# Patient Record
Sex: FEMALE | Race: OTHER | Employment: UNEMPLOYED | URBAN - METROPOLITAN AREA
[De-identification: names, ages, dates, MRNs, and addresses within clinical notes are randomized per-mention and may not be internally consistent; named-entity substitution may affect disease eponyms.]

---

## 2020-12-07 ENCOUNTER — APPOINTMENT (OUTPATIENT)
Dept: CT IMAGING | Age: 75
DRG: 463 | End: 2020-12-07
Payer: MEDICAID

## 2020-12-07 ENCOUNTER — HOSPITAL ENCOUNTER (INPATIENT)
Age: 75
LOS: 2 days | Discharge: ANOTHER HOSPITAL | DRG: 463 | End: 2020-12-09
Attending: EMERGENCY MEDICINE | Admitting: INTERNAL MEDICINE
Payer: MEDICAID

## 2020-12-07 PROBLEM — N39.0 COMPLICATED UTI (URINARY TRACT INFECTION): Status: ACTIVE | Noted: 2020-12-07

## 2020-12-07 LAB
ALBUMIN SERPL-MCNC: 3.4 G/DL (ref 3.5–5.2)
ALP BLD-CCNC: 153 U/L (ref 35–104)
ALT SERPL-CCNC: 18 U/L (ref 0–32)
ANION GAP SERPL CALCULATED.3IONS-SCNC: 9 MMOL/L (ref 7–16)
ANISOCYTOSIS: ABNORMAL
AST SERPL-CCNC: 29 U/L (ref 0–31)
BACTERIA: ABNORMAL /HPF
BASOPHILS ABSOLUTE: 0.11 E9/L (ref 0–0.2)
BASOPHILS RELATIVE PERCENT: 0.5 % (ref 0–2)
BILIRUB SERPL-MCNC: 0.2 MG/DL (ref 0–1.2)
BILIRUBIN URINE: NEGATIVE
BLOOD, URINE: ABNORMAL
BUN BLDV-MCNC: 11 MG/DL (ref 8–23)
CALCIUM SERPL-MCNC: 9.9 MG/DL (ref 8.6–10.2)
CHLORIDE BLD-SCNC: 98 MMOL/L (ref 98–107)
CLARITY: ABNORMAL
CO2: 28 MMOL/L (ref 22–29)
COLOR: YELLOW
CREAT SERPL-MCNC: 0.6 MG/DL (ref 0.5–1)
EOSINOPHILS ABSOLUTE: 6.81 E9/L (ref 0.05–0.5)
EOSINOPHILS RELATIVE PERCENT: 28.6 % (ref 0–6)
GFR AFRICAN AMERICAN: >60
GFR NON-AFRICAN AMERICAN: >60 ML/MIN/1.73
GLUCOSE BLD-MCNC: 139 MG/DL (ref 74–99)
GLUCOSE URINE: NEGATIVE MG/DL
HCT VFR BLD CALC: 33.1 % (ref 34–48)
HEMOGLOBIN: 10.4 G/DL (ref 11.5–15.5)
IMMATURE GRANULOCYTES #: 0.12 E9/L
IMMATURE GRANULOCYTES %: 0.5 % (ref 0–5)
KETONES, URINE: NEGATIVE MG/DL
LACTIC ACID: 1.9 MMOL/L (ref 0.5–2.2)
LEUKOCYTE ESTERASE, URINE: ABNORMAL
LIPASE: 21 U/L (ref 13–60)
LYMPHOCYTES ABSOLUTE: 3.03 E9/L (ref 1.5–4)
LYMPHOCYTES RELATIVE PERCENT: 12.7 % (ref 20–42)
MCH RBC QN AUTO: 28.7 PG (ref 26–35)
MCHC RBC AUTO-ENTMCNC: 31.4 % (ref 32–34.5)
MCV RBC AUTO: 91.2 FL (ref 80–99.9)
MONOCYTES ABSOLUTE: 1.38 E9/L (ref 0.1–0.95)
MONOCYTES RELATIVE PERCENT: 5.8 % (ref 2–12)
NEUTROPHILS ABSOLUTE: 12.35 E9/L (ref 1.8–7.3)
NEUTROPHILS RELATIVE PERCENT: 51.9 % (ref 43–80)
NITRITE, URINE: POSITIVE
PDW BLD-RTO: 14.1 FL (ref 11.5–15)
PH UA: 6 (ref 5–9)
PLATELET # BLD: 629 E9/L (ref 130–450)
PMV BLD AUTO: 9.1 FL (ref 7–12)
POTASSIUM SERPL-SCNC: 3.6 MMOL/L (ref 3.5–5)
PROTEIN UA: 100 MG/DL
RBC # BLD: 3.63 E12/L (ref 3.5–5.5)
RBC UA: >20 /HPF (ref 0–2)
SODIUM BLD-SCNC: 135 MMOL/L (ref 132–146)
SPECIFIC GRAVITY UA: >=1.03 (ref 1–1.03)
TOTAL PROTEIN: 7.9 G/DL (ref 6.4–8.3)
UROBILINOGEN, URINE: 0.2 E.U./DL
WBC # BLD: 23.8 E9/L (ref 4.5–11.5)
WBC UA: >20 /HPF (ref 0–5)

## 2020-12-07 PROCEDURE — 85025 COMPLETE CBC W/AUTO DIFF WBC: CPT

## 2020-12-07 PROCEDURE — 87088 URINE BACTERIA CULTURE: CPT

## 2020-12-07 PROCEDURE — 99223 1ST HOSP IP/OBS HIGH 75: CPT | Performed by: INTERNAL MEDICINE

## 2020-12-07 PROCEDURE — 83690 ASSAY OF LIPASE: CPT

## 2020-12-07 PROCEDURE — 83605 ASSAY OF LACTIC ACID: CPT

## 2020-12-07 PROCEDURE — 87040 BLOOD CULTURE FOR BACTERIA: CPT

## 2020-12-07 PROCEDURE — 2580000003 HC RX 258: Performed by: EMERGENCY MEDICINE

## 2020-12-07 PROCEDURE — 81001 URINALYSIS AUTO W/SCOPE: CPT

## 2020-12-07 PROCEDURE — 6360000004 HC RX CONTRAST MEDICATION: Performed by: RADIOLOGY

## 2020-12-07 PROCEDURE — 2580000003 HC RX 258: Performed by: RADIOLOGY

## 2020-12-07 PROCEDURE — 99284 EMERGENCY DEPT VISIT MOD MDM: CPT

## 2020-12-07 PROCEDURE — 74177 CT ABD & PELVIS W/CONTRAST: CPT

## 2020-12-07 PROCEDURE — 2060000000 HC ICU INTERMEDIATE R&B

## 2020-12-07 PROCEDURE — 6360000002 HC RX W HCPCS: Performed by: EMERGENCY MEDICINE

## 2020-12-07 PROCEDURE — 80053 COMPREHEN METABOLIC PANEL: CPT

## 2020-12-07 RX ORDER — 0.9 % SODIUM CHLORIDE 0.9 %
1000 INTRAVENOUS SOLUTION INTRAVENOUS ONCE
Status: COMPLETED | OUTPATIENT
Start: 2020-12-07 | End: 2020-12-08

## 2020-12-07 RX ORDER — SODIUM CHLORIDE 0.9 % (FLUSH) 0.9 %
10 SYRINGE (ML) INJECTION ONCE
Status: COMPLETED | OUTPATIENT
Start: 2020-12-07 | End: 2020-12-07

## 2020-12-07 RX ADMIN — Medication 10 ML: at 17:34

## 2020-12-07 RX ADMIN — SODIUM CHLORIDE 1000 ML: 9 INJECTION, SOLUTION INTRAVENOUS at 23:13

## 2020-12-07 RX ADMIN — CEFEPIME 2 G: 2 INJECTION, POWDER, FOR SOLUTION INTRAVENOUS at 18:53

## 2020-12-07 RX ADMIN — IOPAMIDOL 75 ML: 755 INJECTION, SOLUTION INTRAVENOUS at 17:38

## 2020-12-07 ASSESSMENT — PAIN DESCRIPTION - LOCATION: LOCATION: ABDOMEN

## 2020-12-07 NOTE — ED PROVIDER NOTES
FIRST PROVIDER CONTACT ASSESSMENT NOTE      Department of Emergency Medicine   ED  First Provider Note   12/7/20  2:13 PM EST    Chief Complaint: Abdominal Pain (from [facility], dx with cervix / bladder cancer, family brought to [place] for care. pt has what appears to be  bilateral nephrostomy tubes in place. pt having abd pain and  ascites)      History of Present Illness:    Shelly Stubbs is a 66 y.o. female who presents to the ED by private car for diffuse abdominal pain. The patient just got here from [facility].  She has cervical cancer with what sounds like it is metastatic disease. The patient has bilateral nephrostomy tubes. She was brought here by family members concerned about increasing and ongoing abdominal pain. Focused Screening Exam:  Constitutional:  Alert, appears stated age and is in no distress. *ALLERGIES*     Patient has no known allergies.      ED Triage Vitals   BP Temp Temp src Pulse Resp SpO2 Height Weight   12/07/20 1411 12/07/20 1413 -- 12/07/20 1411 12/07/20 1411 12/07/20 1411 -- --   (!) 119/54 97 °F (36.1 °C)  101 16 99 %          Initial Plan of Care:  Initiate Treatment-Testing, Proceed toTreatment Area When Bed Available for ED Attending/MLP to Continue Care    -----------------END OF FIRST PROVIDER CONTACT ASSESSMENT NOTE--------------  Electronically signed by Gin Jerez PA-C   DD: 12/7/20       Gin Jerez PA-C  12/07/20 1414

## 2020-12-07 NOTE — ED PROVIDER NOTES
HPI:  12/7/20, Time: 4:01 PM TONY Pate is a 66 y.o. female presenting to the ED for abdominal pain. Patient is Mongolian-speaking, and an  was used to obtain a history. Patient is a poor historian, so it was difficult to obtain a complete history. Patient's family member states that approximately 3 weeks ago the patient was seen in [facility] for abdominal pain. She states that the patient has been living in [facility] for approximately 1 year, but she was visiting [facility] on vacation. She states at that time the patient was diagnosed with a \"abdominal tumor. \"  She states that the patient was admitted to the hospital and underwent a biopsy of the tumor. She is unsure of the results. She also states that the patient had bilateral tubes placed in her kidneys. The patient returned to [facility] on December 3. She has no PCP, and she has not followed up with any doctor [facility]. She states that the patient has been having intermittent abdominal pain. The patient has had no documented fevers, chest pain, shortness of breath, cough, nausea, vomiting, or diarrhea. Her nephrostomy tubes have been functioning well with clear urine. The patient has also been voiding spontaneously. Patient's family did present with a discharge summary from [facility].  I reviewed the paperwork. Patient was admitted on November 22, 2020 for \"suspected cervical malignancy with suspected bladder involvement. \"  She underwent a cervical biopsy on 11/25/20 and bilateral nephrostomy tube placement on 11/18/2020. Patient was discharged home without further treatment because paperwork states she was insistent that she wanted to go to [address] [place] [address] for treatment.     Review of Systems:   Unable to obtain complete ROS because patient is a poor historian.          --------------------------------------------- PAST HISTORY ---------------------------------------------  Past Medical History:  has no past medical history on file. Past Surgical History:  has no past surgical history on file. Social History:      Family History: family history is not on file. The patients home medications have been reviewed. Allergies: Patient has no known allergies.     -------------------------------------------------- RESULTS -------------------------------------------------  All laboratory and radiology results have been personally reviewed by myself   LABS:  Results for orders placed or performed during the hospital encounter of 12/07/20   CBC Auto Differential   Result Value Ref Range    WBC 23.8 (H) 4.5 - 11.5 E9/L    RBC 3.63 3.50 - 5.50 E12/L    Hemoglobin 10.4 (L) 11.5 - 15.5 g/dL    Hematocrit 33.1 (L) 34.0 - 48.0 %    MCV 91.2 80.0 - 99.9 fL    MCH 28.7 26.0 - 35.0 pg    MCHC 31.4 (L) 32.0 - 34.5 %    RDW 14.1 11.5 - 15.0 fL    Platelets 316 (H) 082 - 450 E9/L    MPV 9.1 7.0 - 12.0 fL    Neutrophils % 51.9 43.0 - 80.0 %    Immature Granulocytes % 0.5 0.0 - 5.0 %    Lymphocytes % 12.7 (L) 20.0 - 42.0 %    Monocytes % 5.8 2.0 - 12.0 %    Eosinophils % 28.6 (H) 0.0 - 6.0 %    Basophils % 0.5 0.0 - 2.0 %    Neutrophils Absolute 12.35 (H) 1.80 - 7.30 E9/L    Immature Granulocytes # 0.12 E9/L    Lymphocytes Absolute 3.03 1.50 - 4.00 E9/L    Monocytes Absolute 1.38 (H) 0.10 - 0.95 E9/L    Eosinophils Absolute 6.81 (H) 0.05 - 0.50 E9/L    Basophils Absolute 0.11 0.00 - 0.20 E9/L    Anisocytosis 1+    Comprehensive Metabolic Panel   Result Value Ref Range    Sodium 135 132 - 146 mmol/L    Potassium 3.6 3.5 - 5.0 mmol/L    Chloride 98 98 - 107 mmol/L    CO2 28 22 - 29 mmol/L    Anion Gap 9 7 - 16 mmol/L    Glucose 139 (H) 74 - 99 mg/dL    BUN 11 8 - 23 mg/dL    CREATININE 0.6 0.5 - 1.0 mg/dL    GFR Non-African American >60 >=60 mL/min/1.73    GFR African American >60     Calcium 9.9 8.6 - 10.2 mg/dL    Total Protein 7.9 6.4 - 8.3 g/dL    Alb 3.4 (L) 3.5 - 5.2 g/dL    Total Bilirubin 0.2 0.0 - 1.2 mg/dL    Alkaline Phosphatase 153 (H) 35 - 104 U/L    ALT 18 0 - 32 U/L    AST 29 0 - 31 U/L   Lactic Acid, Plasma   Result Value Ref Range    Lactic Acid 1.9 0.5 - 2.2 mmol/L   Lipase   Result Value Ref Range    Lipase 21 13 - 60 U/L   Urinalysis   Result Value Ref Range    Color, UA Yellow Straw/Yellow    Clarity, UA SL CLOUDY Clear    Glucose, Ur Negative Negative mg/dL    Bilirubin Urine Negative Negative    Ketones, Urine Negative Negative mg/dL    Specific Gravity, UA >=1.030 1.005 - 1.030    Blood, Urine MODERATE (A) Negative    pH, UA 6.0 5.0 - 9.0    Protein,  (A) Negative mg/dL    Urobilinogen, Urine 0.2 <2.0 E.U./dL    Nitrite, Urine POSITIVE (A) Negative    Leukocyte Esterase, Urine MODERATE (A) Negative   Microscopic Urinalysis   Result Value Ref Range    WBC, UA >20 (A) 0 - 5 /HPF    RBC, UA >20 0 - 2 /HPF    Bacteria, UA MANY (A) None Seen /HPF       RADIOLOGY:  Interpreted by Radiologist.  CT ABDOMEN PELVIS W IV CONTRAST Additional Contrast? None   Final Result   1. Large, infiltrative cervical mass, demonstrating invasion of the posterior   bladder, distal ureters, uterus and proximal vagina (stage IV). 2. No lymphadenopathy or distant metastases are seen in the abdomen and   pelvis. 3. Percutaneous nephrostomy tubes are seen in the kidneys bilaterally. No   hydronephrosis.          ------------------------- NURSING NOTES AND VITALS REVIEWED ---------------------------   The nursing notes within the ED encounter and vital signs as below have been reviewed.    BP (!) 104/49   Pulse 105   Temp 98.5 °F (36.9 °C) (Oral)   Resp 16   Ht 4' 1\" (1.245 m)   Wt 135 lb (61.2 kg)   SpO2 95%   BMI 39.53 kg/m²   Oxygen Saturation Interpretation: Normal      ---------------------------------------------------PHYSICAL EXAM--------------------------------------      Constitutional/General: Alert, appears chronically ill, non toxic in NAD  Head: Normocephalic and atraumatic  Eyes: PERRL, EOMI  Mouth: Oropharynx clear, handling secretions, no trismus  Neck: Supple, full ROM,   Pulmonary: Lungs clear to auscultation bilaterally, no wheezes, rales, or rhonchi. Not in respiratory distress  Cardiovascular:  Regular rate and rhythm, no murmurs, gallops, or rubs. 2+ distal pulses  Abdomen: Soft, non tender, non distended, bilateral nephrostomy tubes in place with clear yellow urine in bags. Extremities: Moves all extremities x 4. Warm and well perfused  Skin: warm and dry without rash  Neurologic: GCS 15, no focal motor or sensory deficits   Psych: Normal Affect      ------------------------------ ED COURSE/MEDICAL DECISION MAKING----------------------  Medications   cefepime (MAXIPIME) 2 g IVPB extended (mini-bag) (2 g Intravenous New Bag 12/7/20 9814)   0.9 % sodium chloride bolus (has no administration in time range)   iopamidol (ISOVUE-370) 76 % injection 75 mL (75 mLs Intravenous Given 12/7/20 5995)   sodium chloride flush 0.9 % injection 10 mL (10 mLs Intravenous Given 12/7/20 8249)       Medical Decision Making/ED COURSE:   Patient is a 57-year-old female with recent diagnosis of cervical tumor and bilateral nephrostomy tube placement in [facility] presenting with abdominal pain. In the ED, patient was hemodynamically stable and afebrile. On exam, appeared chronically ill but nontoxic. Labs and CT AP obtained. I reviewed and interpreted labs. Labs significant for a leukocytosis of 23.8. Urinalysis obtained from patient voiding showed evidence of infection. Urine and blood culture sent. Patient administered IVF and cefepime for complicated UTI. CT abdomen pelvis showed large tumor that appeared to be arising from the cervix with bladder involvement. Plan to admit for further work-up and treatment. Patient remained hemodynamically stable throughout ED course. ED Course as of Dec 07 2122   Mon Dec 07, 2020   1816 Hospitalist was consulted. Spoke with Dr. Marissa Kwon who accepted the patient for admission.     [JA]   1848 Discussed extensively with patient's daughter now at bedside findings and plan for admission. An  was used. [JA]      ED Course User Index  [JA] Mago Nolasco MD           Counseling: The emergency provider has spoken with the patient and discussed todays results, in addition to providing specific details for the plan of care and counseling regarding the diagnosis and prognosis. Questions are answered at this time and they are agreeable with the plan.      --------------------------------- IMPRESSION AND DISPOSITION ---------------------------------    IMPRESSION  1. Cervical mass    2. Complicated UTI (urinary tract infection)    3. Leukocytosis, unspecified type    4. Thrombocytosis ([address]        DISPOSITION  Disposition: Admit to telemetry  Patient condition is stable      NOTE: This report was transcribed using voice recognition software.  Every effort was made to ensure accuracy; however, inadvertent computerized transcription errors may be present    I, Mago Nolasco MD, am the primary provider of this record       Mago Nolasco MD  12/07/20 7434

## 2020-12-08 LAB
ANION GAP SERPL CALCULATED.3IONS-SCNC: 7 MMOL/L (ref 7–16)
BASOPHILS ABSOLUTE: 0.13 E9/L (ref 0–0.2)
BASOPHILS RELATIVE PERCENT: 0.6 % (ref 0–2)
BUN BLDV-MCNC: 7 MG/DL (ref 8–23)
CALCIUM SERPL-MCNC: 9.2 MG/DL (ref 8.6–10.2)
CHLORIDE BLD-SCNC: 106 MMOL/L (ref 98–107)
CO2: 23 MMOL/L (ref 22–29)
CREAT SERPL-MCNC: 0.5 MG/DL (ref 0.5–1)
EOSINOPHILS ABSOLUTE: 6.95 E9/L (ref 0.05–0.5)
EOSINOPHILS RELATIVE PERCENT: 30.1 % (ref 0–6)
GFR AFRICAN AMERICAN: >60
GFR NON-AFRICAN AMERICAN: >60 ML/MIN/1.73
GLUCOSE BLD-MCNC: 94 MG/DL (ref 74–99)
HCT VFR BLD CALC: 30.7 % (ref 34–48)
HEMOGLOBIN: 9.5 G/DL (ref 11.5–15.5)
IMMATURE GRANULOCYTES #: 0.11 E9/L
IMMATURE GRANULOCYTES %: 0.5 % (ref 0–5)
LYMPHOCYTES ABSOLUTE: 3.31 E9/L (ref 1.5–4)
LYMPHOCYTES RELATIVE PERCENT: 14.3 % (ref 20–42)
MCH RBC QN AUTO: 28.1 PG (ref 26–35)
MCHC RBC AUTO-ENTMCNC: 30.9 % (ref 32–34.5)
MCV RBC AUTO: 90.8 FL (ref 80–99.9)
MONOCYTES ABSOLUTE: 1.4 E9/L (ref 0.1–0.95)
MONOCYTES RELATIVE PERCENT: 6.1 % (ref 2–12)
NEUTROPHILS ABSOLUTE: 11.21 E9/L (ref 1.8–7.3)
NEUTROPHILS RELATIVE PERCENT: 48.4 % (ref 43–80)
PDW BLD-RTO: 14.2 FL (ref 11.5–15)
PLATELET # BLD: 582 E9/L (ref 130–450)
PMV BLD AUTO: 9.4 FL (ref 7–12)
POTASSIUM REFLEX MAGNESIUM: 3.8 MMOL/L (ref 3.5–5)
RBC # BLD: 3.38 E12/L (ref 3.5–5.5)
RBC # BLD: NORMAL 10*6/UL
SODIUM BLD-SCNC: 136 MMOL/L (ref 132–146)
WBC # BLD: 23.1 E9/L (ref 4.5–11.5)

## 2020-12-08 PROCEDURE — 85025 COMPLETE CBC W/AUTO DIFF WBC: CPT

## 2020-12-08 PROCEDURE — 1200000000 HC SEMI PRIVATE

## 2020-12-08 PROCEDURE — 80048 BASIC METABOLIC PNL TOTAL CA: CPT

## 2020-12-08 PROCEDURE — 6370000000 HC RX 637 (ALT 250 FOR IP): Performed by: INTERNAL MEDICINE

## 2020-12-08 PROCEDURE — 99238 HOSP IP/OBS DSCHRG MGMT 30/<: CPT | Performed by: INTERNAL MEDICINE

## 2020-12-08 PROCEDURE — 6370000000 HC RX 637 (ALT 250 FOR IP): Performed by: NURSE PRACTITIONER

## 2020-12-08 PROCEDURE — 6360000002 HC RX W HCPCS: Performed by: INTERNAL MEDICINE

## 2020-12-08 PROCEDURE — 36415 COLL VENOUS BLD VENIPUNCTURE: CPT

## 2020-12-08 PROCEDURE — 2580000003 HC RX 258: Performed by: INTERNAL MEDICINE

## 2020-12-08 RX ORDER — POLYETHYLENE GLYCOL 3350 17 G/17G
17 POWDER, FOR SOLUTION ORAL DAILY PRN
Status: DISCONTINUED | OUTPATIENT
Start: 2020-12-08 | End: 2020-12-09 | Stop reason: HOSPADM

## 2020-12-08 RX ORDER — ACETAMINOPHEN 650 MG/1
650 SUPPOSITORY RECTAL EVERY 6 HOURS PRN
Status: DISCONTINUED | OUTPATIENT
Start: 2020-12-08 | End: 2020-12-09 | Stop reason: HOSPADM

## 2020-12-08 RX ORDER — SODIUM CHLORIDE 0.9 % (FLUSH) 0.9 %
10 SYRINGE (ML) INJECTION PRN
Status: DISCONTINUED | OUTPATIENT
Start: 2020-12-08 | End: 2020-12-09 | Stop reason: HOSPADM

## 2020-12-08 RX ORDER — HEPARIN SODIUM 10000 [USP'U]/ML
5000 INJECTION, SOLUTION INTRAVENOUS; SUBCUTANEOUS EVERY 8 HOURS SCHEDULED
Status: DISCONTINUED | OUTPATIENT
Start: 2020-12-08 | End: 2020-12-09 | Stop reason: HOSPADM

## 2020-12-08 RX ORDER — LIDOCAINE AND PRILOCAINE 25; 25 MG/G; MG/G
CREAM TOPICAL PRN
Status: DISCONTINUED | OUTPATIENT
Start: 2020-12-08 | End: 2020-12-09 | Stop reason: HOSPADM

## 2020-12-08 RX ORDER — ACETAMINOPHEN 325 MG/1
650 TABLET ORAL EVERY 6 HOURS PRN
COMMUNITY

## 2020-12-08 RX ORDER — SODIUM CHLORIDE 9 MG/ML
INJECTION, SOLUTION INTRAVENOUS CONTINUOUS
Status: DISCONTINUED | OUTPATIENT
Start: 2020-12-08 | End: 2020-12-09 | Stop reason: HOSPADM

## 2020-12-08 RX ORDER — SODIUM CHLORIDE 0.9 % (FLUSH) 0.9 %
10 SYRINGE (ML) INJECTION EVERY 12 HOURS SCHEDULED
Status: DISCONTINUED | OUTPATIENT
Start: 2020-12-08 | End: 2020-12-09 | Stop reason: HOSPADM

## 2020-12-08 RX ORDER — ACETAMINOPHEN 325 MG/1
650 TABLET ORAL EVERY 6 HOURS PRN
Status: DISCONTINUED | OUTPATIENT
Start: 2020-12-08 | End: 2020-12-09 | Stop reason: HOSPADM

## 2020-12-08 RX ADMIN — SODIUM CHLORIDE: 9 INJECTION, SOLUTION INTRAVENOUS at 01:30

## 2020-12-08 RX ADMIN — SODIUM CHLORIDE, PRESERVATIVE FREE 10 ML: 5 INJECTION INTRAVENOUS at 23:21

## 2020-12-08 RX ADMIN — ACETAMINOPHEN 650 MG: 325 TABLET ORAL at 05:19

## 2020-12-08 RX ADMIN — LIDOCAINE AND PRILOCAINE: 25; 25 CREAM TOPICAL at 22:30

## 2020-12-08 RX ADMIN — POLYETHYLENE GLYCOL 3350 17 G: 17 POWDER, FOR SOLUTION ORAL at 23:30

## 2020-12-08 RX ADMIN — CEFEPIME 2 G: 2 INJECTION, POWDER, FOR SOLUTION INTRAVENOUS at 06:59

## 2020-12-08 RX ADMIN — MAGNESIUM HYDROXIDE 30 ML: 400 SUSPENSION ORAL at 15:13

## 2020-12-08 RX ADMIN — CEFEPIME 2 G: 2 INJECTION, POWDER, FOR SOLUTION INTRAVENOUS at 19:03

## 2020-12-08 RX ADMIN — ACETAMINOPHEN 650 MG: 325 TABLET ORAL at 15:11

## 2020-12-08 RX ADMIN — SODIUM CHLORIDE, PRESERVATIVE FREE 10 ML: 5 INJECTION INTRAVENOUS at 19:00

## 2020-12-08 RX ADMIN — SODIUM CHLORIDE, PRESERVATIVE FREE 10 ML: 5 INJECTION INTRAVENOUS at 16:27

## 2020-12-08 ASSESSMENT — PAIN SCALES - GENERAL
PAINLEVEL_OUTOF10: 0
PAINLEVEL_OUTOF10: 6
PAINLEVEL_OUTOF10: 0

## 2020-12-08 NOTE — PROGRESS NOTES
Access center called and stated they need a prior auth. NPI- Dr. Kenia Lucio [id number]. Pt's insurance number on back of card is [phone]. Attemptd to get prior auth for pt but once on the phone with [employer] they wanted more information that I could not provide I had to end call because of lack of information.    will have to set up in am.

## 2020-12-08 NOTE — H&P
[facility] History and Physical      CHIEF COMPLAINT: hematuria    History of Present Illness:  67 yo female who speaks Uzbek, poor historian even with . A week ago noticed hematuria for 2 days. Associated with burning micturition that has mostly resolved but still with some persistence. Had a fever for one day that resolved with tylenol. She is unable to provide further history therefore obtained from daughter/granddaughter over the phone. Recently with vaginal bleeding, constipation and incontinence. Was on vacation in [place] 3 weeks ago, went to a hospital there where she underwent bilateral nephrostomy tube placement, had a biopsy but the family members arent able to answer where it was from. They were told she has a cancer, also she has a urine and blood infection. She left the hospital there and came to the ED here to continue the workup. Informant(s) for H&P:pt/daughter/granddaughter. REVIEW OF SYSTEMS:  A comprehensive 14 point review of systems was negative except for: what is in the HPI    PMH:  none    Surgical History:  No past surgical history on file. Appendectomy    Medications Prior to Admission:    Prior to Admission medications    Not on File       Allergies:    Patient has no known allergies.     Social History:      Prior tobacco abuse but quit 3 wks ago every half hr a cigarette    Family History:   Mom colon ca      PHYSICAL EXAM:  Vitals:  BP (!) 104/49   Pulse 105   Temp 98.5 °F (36.9 °C) (Oral)   Resp 16   Ht 4' 1\" (1.245 m)   Wt 135 lb (61.2 kg)   SpO2 95%   BMI 39.53 kg/m²   General Appearance: alert and oriented to person, place and time and in no acute distress  Skin: warm and dry, turgor not diminished  Head: normocephalic and atraumatic  Eyes: pupils equal, round, and reactive to light, extraocular eye movements intact, conjunctivae normal  Neck: neck supple and non tender without mass   Pulmonary/Chest: clear to auscultation bilaterally- no wheezes, rales or rhonchi, normal air movement, no respiratory distress  Cardiovascular: normal rate, normal S1 and S2 and no M/R/R  Abdomen: soft, non-tender, non-distended, normal bowel sounds, no masses or organomegaly  Extremities: no cyanosis, no clubbing and no edema  Neurologic: no cranial nerve deficit and speech normal      LABS:  Recent Labs     12/07/20  1457      K 3.6   CL 98   CO2 28   BUN 11   CREATININE 0.6   GLUCOSE 139*   CALCIUM 9.9       Recent Labs     12/07/20  1457   WBC 23.8*   RBC 3.63   HGB 10.4*   HCT 33.1*   MCV 91.2   MCH 28.7   MCHC 31.4*   RDW 14.1   *   MPV 9.1       No results for input(s): POCGLU in the last 72 hours. Radiology:   CT ABDOMEN PELVIS W IV CONTRAST Additional Contrast? None   Final Result   1. Large, infiltrative cervical mass, demonstrating invasion of the posterior   bladder, distal ureters, uterus and proximal vagina (stage IV). 2. No lymphadenopathy or distant metastases are seen in the abdomen and   pelvis. 3. Percutaneous nephrostomy tubes are seen in the kidneys bilaterally. No   hydronephrosis. EKG: pending    ASSESSMENT:    Urinary tract infection and told she has bacteremia  Suspected cervical cancer s/p biopsy in [place]   Urinary obstruction s/p bilateral nephrostomy tubes  Prior tobacco abuse    PLAN:  UTI/?bacteremia  -repeat blood culture, urine culture  -continue cefepime, ivf    Suspected cervical cancer:  -obtain records from [facility]  -consult gyn onc. Urinary obstruction:   -nephrostomy tubes draining clear urine now    DVT prophylaxis: heparin and monitor for recurrence of hematuria. NOTE: This report was transcribed using voice recognition software. Every effort was made to ensure accuracy; however, inadvertent computerized transcription errors may be present.   Electronically signed by Carmen Lopez MD on 12/7/2020 at 9:09 PM

## 2020-12-08 NOTE — PROGRESS NOTES
As per gynecologist over here she should be transferred to [facility] or [place] since we do not have a [place] [address]  service. Transfer arranged at [facility]. Floor notified. She will be transferred in stable status.

## 2020-12-08 NOTE — CONSULTS
Department of Gynecology   Consultation      Reason for Consult: GYN oncology for stage IV cervical cancer    Requesting Physician:  Lakia Gauthier MD    CHIEF COMPLAINT:   Hematuria    PROBLEM LIST:     Patient Active Problem List   Diagnosis    Complicated UTI (urinary tract infection)       Reason for Admission: UTI complicated by bacteremia    History obtained from patient through a hospital , electronic medical record    HISTORY OF PRESENT ILLNESS:                     The patient is a 66 y.o. female  who presents with known history of stage IV cervical cancer with urinary obstruction status post bilateral nephrostomy tubes. Patient was admitted on November 22, 2020 in [facility] for \"suspected cervical malignancy with suspected bladder involvement. \"  She underwent a cervical biopsy on 11/25/20 and bilateral nephrostomy tube placement on 11/18/2020. Patient was discharged home without further treatment because paperwork states she was insistent that she wanted to go to [place] for treatment. Past Gynecological History:  Last menstrual period: Is menopausal patient bleeds intermittently    Past OB History  OB History    No obstetric history on file. OB History   No obstetric history on file. Past Medical History:    No past medical history on file. Past Surgical History:    No past surgical history on file. Allergies:    Patient has no known allergies.     Social History:    Social History     Socioeconomic History    Marital status: Single     Spouse name: Not on file    Number of children: Not on file    Years of education: Not on file    Highest education level: Not on file   Occupational History    Not on file   Social Needs    Financial resource strain: Not on file    Food insecurity     Worry: Not on file     Inability: Not on file    Transportation needs     Medical: Not on file     Non-medical: Not on file   Tobacco Use    Smoking status: Not on file   Substance and Sexual Activity    Alcohol use: Not on file    Drug use: Not on file    Sexual activity: Not on file   Lifestyle    Physical activity     Days per week: Not on file     Minutes per session: Not on file    Stress: Not on file   Relationships    Social connections     Talks on phone: Not on file     Gets together: Not on file     Attends Jewish service: Not on file     Active member of club or organization: Not on file     Attends meetings of clubs or organizations: Not on file     Relationship status: Not on file    Intimate partner violence     Fear of current or ex partner: Not on file     Emotionally abused: Not on file     Physically abused: Not on file     Forced sexual activity: Not on file   Other Topics Concern    Not on file   Social History Narrative    Not on file       Family History:   No family history on file.     Medications Prior to Admission:  Medications Prior to Admission: acetaminophen (TYLENOL) 325 MG tablet, Take 650 mg by mouth every 6 hours as needed for Pain    REVIEW OF SYSTEMS:    Constitutional:  negative  Gastrointestinal:  negative  Genitourinary: Has nephrostomy tubes  Integument/breast:  negative  Hematologic/lymphatic:  negative  Endocrine:  negative  Behavior/Psych:  negative    PHYSICAL EXAM:    Vitals:  BP (!) 106/53   Pulse 87   Temp 99.8 °F (37.7 °C) (Oral)   Resp 16   Ht 4' 1\" (1.245 m)   Wt 136 lb 2 oz (61.7 kg)   SpO2 97%   BMI 39.86 kg/m²   CONSTITUTIONAL:  awake, alert, cooperative, no apparent distress, and appears stated age  EYES:  Lids and lashes normal, pupils equal, round and reactive to light, extra ocular muscles intact, sclera clear, conjunctiva normal  ENT:  Normocephalic, without obvious abnormality, atraumatic, sinuses nontender on palpation, external ears without lesions, oral pharynx with moist mucus membranes  NECK:  Supple, symmetrical, trachea midline, no adenopathy, thyroid symmetric, not enlarged and no tenderness, skin normal  HEMATOLOGIC/LYMPHATICS:  no cervical lymphadenopathy and no supraclavicular lymphadenopathy  BACK:  Symmetric, no curvature, spinous processes are non-tender on palpation, paraspinous muscles are non-tender on palpation, no costal vertebral tenderness  LUNGS:  No increased work of breathing, good air exchange, clear to auscultation bilaterally, no crackles or wheezing  CARDIOVASCULAR:  Normal apical impulse, regular rate and rhythm  ABDOMEN:  Normal bowel sounds, soft, non-distended, non-tender, no masses palpated, no hepatosplenomegally   CHEST/BREASTS:  deferred  GENITAL/URINARY:  deferred  MUSCULOSKELETAL:  There is no redness, warmth, or swelling of the joints. Full range of motion noted. Motor strength is 5 out of 5 all extremities bilaterally. Tone is normal.  NEUROLOGIC:  Awake, alert, oriented to name, place and time. Cranial nerves II-XII are grossly intact. Motor is 5 out of 5 bilaterally. Cerebellar finger to nose, heel to shin intact. Sensory is intact. Babinski down going, Romberg negative, and gait is normal.  SKIN:  no bruising or bleeding, normal skin color, texture, turgor, no redness, warmth, or swelling and no rashes    DATA:  Radiology:   CT ABDOMEN PELVIS W IV CONTRAST Additional Contrast? None   Final Result   1. Large, infiltrative cervical mass, demonstrating invasion of the posterior   bladder, distal ureters, uterus and proximal vagina (stage IV). 2. No lymphadenopathy or distant metastases are seen in the abdomen and   pelvis. 3. Percutaneous nephrostomy tubes are seen in the kidneys bilaterally.   No   hydronephrosis.           LABS:  Recent Results (from the past 72 hour(s))   CBC Auto Differential    Collection Time: 12/07/20  2:57 PM   Result Value Ref Range    WBC 23.8 (H) 4.5 - 11.5 E9/L    RBC 3.63 3.50 - 5.50 E12/L    Hemoglobin 10.4 (L) 11.5 - 15.5 g/dL    Hematocrit 33.1 (L) 34.0 - 48.0 %    MCV 91.2 80.0 - 99.9 fL    MCH 28.7 26.0 - 35.0 pg    MCHC 31.4 (L) 32.0 - 34.5 %    RDW 14.1 11.5 - 15.0 fL    Platelets 182 (H) 262 - 450 E9/L    MPV 9.1 7.0 - 12.0 fL    Neutrophils % 51.9 43.0 - 80.0 %    Immature Granulocytes % 0.5 0.0 - 5.0 %    Lymphocytes % 12.7 (L) 20.0 - 42.0 %    Monocytes % 5.8 2.0 - 12.0 %    Eosinophils % 28.6 (H) 0.0 - 6.0 %    Basophils % 0.5 0.0 - 2.0 %    Neutrophils Absolute 12.35 (H) 1.80 - 7.30 E9/L    Immature Granulocytes # 0.12 E9/L    Lymphocytes Absolute 3.03 1.50 - 4.00 E9/L    Monocytes Absolute 1.38 (H) 0.10 - 0.95 E9/L    Eosinophils Absolute 6.81 (H) 0.05 - 0.50 E9/L    Basophils Absolute 0.11 0.00 - 0.20 E9/L    Anisocytosis 1+    Comprehensive Metabolic Panel    Collection Time: 12/07/20  2:57 PM   Result Value Ref Range    Sodium 135 132 - 146 mmol/L    Potassium 3.6 3.5 - 5.0 mmol/L    Chloride 98 98 - 107 mmol/L    CO2 28 22 - 29 mmol/L    Anion Gap 9 7 - 16 mmol/L    Glucose 139 (H) 74 - 99 mg/dL    BUN 11 8 - 23 mg/dL    CREATININE 0.6 0.5 - 1.0 mg/dL    GFR Non-African American >60 >=60 mL/min/1.73    GFR African American >60     Calcium 9.9 8.6 - 10.2 mg/dL    Total Protein 7.9 6.4 - 8.3 g/dL    Alb 3.4 (L) 3.5 - 5.2 g/dL    Total Bilirubin 0.2 0.0 - 1.2 mg/dL    Alkaline Phosphatase 153 (H) 35 - 104 U/L    ALT 18 0 - 32 U/L    AST 29 0 - 31 U/L   Lactic Acid, Plasma    Collection Time: 12/07/20  2:57 PM   Result Value Ref Range    Lactic Acid 1.9 0.5 - 2.2 mmol/L   Lipase    Collection Time: 12/07/20  2:57 PM   Result Value Ref Range    Lipase 21 13 - 60 U/L   Urinalysis    Collection Time: 12/07/20  4:01 PM   Result Value Ref Range    Color, UA Yellow Straw/Yellow    Clarity, UA SL CLOUDY Clear    Glucose, Ur Negative Negative mg/dL    Bilirubin Urine Negative Negative    Ketones, Urine Negative Negative mg/dL    Specific Gravity, UA >=1.030 1.005 - 1.030    Blood, Urine MODERATE (A) Negative    pH, UA 6.0 5.0 - 9.0    Protein,  (A) Negative mg/dL    Urobilinogen, Urine 0.2 <2.0 E.U./dL    Nitrite, Urine POSITIVE (A) Negative    Leukocyte Esterase, Urine MODERATE (A) Negative   Culture, Urine    Collection Time: 12/07/20  4:01 PM    Specimen: Urine, clean catch   Result Value Ref Range    Urine Culture, Routine       Growth present, evaluating for:  Gram positive organism  Gram negative rods     Microscopic Urinalysis    Collection Time: 12/07/20  4:01 PM   Result Value Ref Range    WBC, UA >20 (A) 0 - 5 /HPF    RBC, UA >20 0 - 2 /HPF    Bacteria, UA MANY (A) None Seen /HPF   Basic Metabolic Panel w/ Reflex to MG    Collection Time: 12/08/20  7:05 AM   Result Value Ref Range    Sodium 136 132 - 146 mmol/L    Potassium reflex Magnesium 3.8 3.5 - 5.0 mmol/L    Chloride 106 98 - 107 mmol/L    CO2 23 22 - 29 mmol/L    Anion Gap 7 7 - 16 mmol/L    Glucose 94 74 - 99 mg/dL    BUN 7 (L) 8 - 23 mg/dL    CREATININE 0.5 0.5 - 1.0 mg/dL    GFR Non-African American >60 >=60 mL/min/1.73    GFR African American >60     Calcium 9.2 8.6 - 10.2 mg/dL   CBC auto differential    Collection Time: 12/08/20  7:05 AM   Result Value Ref Range    WBC 23.1 (H) 4.5 - 11.5 E9/L    RBC 3.38 (L) 3.50 - 5.50 E12/L    Hemoglobin 9.5 (L) 11.5 - 15.5 g/dL    Hematocrit 30.7 (L) 34.0 - 48.0 %    MCV 90.8 80.0 - 99.9 fL    MCH 28.1 26.0 - 35.0 pg    MCHC 30.9 (L) 32.0 - 34.5 %    RDW 14.2 11.5 - 15.0 fL    Platelets 117 (H) 942 - 450 E9/L    MPV 9.4 7.0 - 12.0 fL    Neutrophils % 48.4 43.0 - 80.0 %    Immature Granulocytes % 0.5 0.0 - 5.0 %    Lymphocytes % 14.3 (L) 20.0 - 42.0 %    Monocytes % 6.1 2.0 - 12.0 %    Eosinophils % 30.1 (H) 0.0 - 6.0 %    Basophils % 0.6 0.0 - 2.0 %    Neutrophils Absolute 11.21 (H) 1.80 - 7.30 E9/L    Immature Granulocytes # 0.11 E9/L    Lymphocytes Absolute 3.31 1.50 - 4.00 E9/L    Monocytes Absolute 1.40 (H) 0.10 - 0.95 E9/L    Eosinophils Absolute 6.95 (H) 0.05 - 0.50 E9/L    Basophils Absolute 0.13 0.00 - 0.20 E9/L    RBC Morphology Normal        IMPRESSION/RECOMMENDATIONS:    Stage IV cervical cancer with bladder involvement and nephrostomy tube placement    The American Congress of Obstetrics and Gynecology (ACOG) strongly recommends that patients that have known gynecologic cancer or high suspicion of a gynecologic malignancy undergo operative management by GYN oncology for proper surgical staging as this has been shown to significantly improve patient outcomes and long-term survival rates. In addition to this we do not have an in-house GYN ONC Service and OB/GYN Generalists on Staff do not have operative privileges to medically or surgically manage patients with a high index of suspicion for malignancy let alone an established diagnosis. Depending on her and her family's preferences, patient may be discharged home and be referred to GYN oncology as an outpatient or arrangements may be made for transfer to a tertiary institution with GYN oncology services (Dr Medardo Islas at [facility]; Dr Marsha Jasmine at [facility]; or providers in [place] or [facility] in [place]). For continuity purposes she may follow-up in the OB/GYN ambulatory clinic under the care of Dr. Soha Lopez after discharged from the care of GYN/ONC.     Valentine Jones MD, FACOG  12/8/2020 12:17 PM

## 2020-12-08 NOTE — DISCHARGE SUMMARY
[facility] Physician Discharge Summary       Transfer to [facility]          Condition on discharge: Stable     Patient ID:  Lan Limon  [id number]  66 y.o.  1945    Admit date: 12/7/2020    Discharge date and time:  12/8/2020  3:27 PM    Admission Diagnoses: Active Problems:    Complicated UTI (urinary tract infection)  Resolved Problems:    * No resolved hospital problems. *      Discharge Diagnoses: Active Problems:    Complicated UTI (urinary tract infection)  Resolved Problems:    * No resolved hospital problems. *      Consults:  IP CONSULT TO OB GYN    Procedures: none     Hospital Course:   Patient Lan Limon is a 66 y.o. presented with Complicated UTI (urinary tract infection) [Q70.4]  Complicated UTI (urinary tract infection) [N39.0]   Patient presented to the ER with complaints of abdominal pain. She was treated for;      1.  Cervical mass/ suspected cervical cancer: pt presented to the ER with concern of abdominal pain. Pt has been residing in [address] [place] [address] for 1 year. Pt recently was on vacation in [facility] where she had bilateral nephrostomy tubes placed and was told \" abdominal tumor. \" She had a biopsy done at that time. Concern that she had suspected cervical malignancy with bladder involvement. She then returned to [address] [place] [address] on 12/3. She has no PCP or follow up. He was discharged with no follow up or treatment plan as she was insistent that she wanted to return to [place] for treatment. OB/ GYN consulted- appreciate input. Pt requires GYN ONC service which we don't offer her. Recommendation was for pt to be transferred to tertiary center. Transfer line was called by attending and patient has been admitted at [facility]- awaiting bed.     2. Complicated UTI with bilateral nephrostomy tubes: UA reviewed. Continue cefepime. Follow culture.     3. S/p nephrostomy tubes due to obstruction.  CT abdomen reviewed.      4. Constipation: bowel regimen        Patient to be transferred to [facility] to be evaluated by GYN/ ONC services. Pt was then transferred to tertiary center with the following medications, instructions, and follow up.         Discharge Exam:  General Appearance: alert and oriented to person, place and time and in no acute distress  Skin: warm and dry  Head: normocephalic and atraumatic  Neck: neck supple and non tender without mass   Pulmonary/Chest: diminished throughout to auscultation   Cardiovascular: normal rate, normal S1 and S2 and no carotid bruits  Abdomen: soft, non-tender, non-distended, normal bowel sounds, no masses or organomegaly- bilateral nephrostomy tubes- clear yellow   Extremities: no cyanosis, no clubbing and no edema  Neurologic: speech normal        I/O last 3 completed shifts: In: 595 [P.O.:120; I.V.:425; IV Piggyback:50]  Out: 1250 [Urine:1250]  No intake/output data recorded. LABS:  Recent Labs     12/07/20  1457 12/08/20  0705    136   K 3.6 3.8   CL 98 106   CO2 28 23   BUN 11 7*   CREATININE 0.6 0.5   GLUCOSE 139* 94   CALCIUM 9.9 9.2       Recent Labs     12/07/20  1457 12/08/20  0705   WBC 23.8* 23.1*   RBC 3.63 3.38*   HGB 10.4* 9.5*   HCT 33.1* 30.7*   MCV 91.2 90.8   MCH 28.7 28.1   MCHC 31.4* 30.9*   RDW 14.1 14.2   * 582*   MPV 9.1 9.4       No results for input(s): POCGLU in the last 72 hours. Imaging:  Ct Abdomen Pelvis W Iv Contrast Additional Contrast? None    Result Date: 12/7/2020  EXAMINATION: CT OF THE ABDOMEN AND PELVIS WITH CONTRAST 12/7/2020 5:35 pm TECHNIQUE: CT of the abdomen and pelvis was performed with the administration of intravenous contrast. Multiplanar reformatted images are provided for review. Dose modulation, iterative reconstruction, and/or weight based adjustment of the mA/kV was utilized to reduce the radiation dose to as low as reasonably achievable. COMPARISON: None.  HISTORY: ORDERING SYSTEM PROVIDED HISTORY: abdominal pain, reported history of abdominal tumor, nephrostomy tubes in place TECHNOLOGIST PROVIDED HISTORY: Reason for exam:->abdominal pain, reported history of abdominal tumor, nephrostomy tubes in place Additional Contrast?->None FINDINGS: Lower Chest: The lung bases are clear. The heart is normal in size. No pleural or pericardial effusion. Small hiatal hernia is seen. Organs: Liver: Unremarkable. Gallbladder: Unremarkable. Pancreas: Unremarkable. Spleen:  Unremarkable. Adrenals: Unremarkable. Kidneys: Bilateral percutaneous nephrostomy catheters are present in order to relieve obstruction of the distal ureters produced by infiltrative cervical cancer. GI/Bowel: No bowel wall thickening or obstruction is seen. Normal appendix is noted. Pelvis: There is a large enhancing mass in the pelvis consistent with patient's history of cervical cancer. The cancer measures 6.5 x 7.3 cm in the maximal axial plane and 9.8 cm in the maximal craniocaudal plane. The cancer invades the posterior wall of the urinary bladder, distal ureters, uterine body and the proximal vagina (stage IV). Peritoneum/Retroperitoneum: No lymphadenopathy is seen in the abdomen or the pelvis. Mild to moderate atherosclerosis is seen in the aorta. No aneurysm. Bones/Soft Tissues:    1. Large, infiltrative cervical mass, demonstrating invasion of the posterior bladder, distal ureters, uterus and proximal vagina (stage IV). 2. No lymphadenopathy or distant metastases are seen in the abdomen and pelvis. 3. Percutaneous nephrostomy tubes are seen in the kidneys bilaterally. No hydronephrosis.       Patient Instructions:      Medication List      ASK your doctor about these medications    acetaminophen 325 MG tablet  Commonly known as:  TYLENOL              Note that more than 30 minutes was spent in preparing discharge papers, discussing discharge with patient, medication review, etc.    Signed:  Electronically signed by RUDOLPH Carrillo on 12/8/2020 at 3:27 PM

## 2020-12-08 NOTE — PROGRESS NOTES
Pt accepted to [facility], Face sheet faxed over.   Awaiting a bed assignment

## 2020-12-08 NOTE — PROGRESS NOTES
Called Destiney Nuñez regarding numbing cream for IV site, or patients family will  not allow another site to be put in.

## 2020-12-08 NOTE — PROGRESS NOTES
Spoke with daughter Lara Buck she is OK with transfer to either [facility], [facility]. or [facility]

## 2020-12-08 NOTE — PROGRESS NOTES
Family called regarding patient wanting diaper , I told family I would give patient a pull up. Family also wants patient on phone while we insert a new IV, I explained to family that patient can't hold phone and have conversation while trying to place IV  In patients arm.

## 2020-12-08 NOTE — PROGRESS NOTES
Spoke with  [id number] regarding Heparin shot which patient refused and patient given tylenol for gas pain.

## 2020-12-08 NOTE — PROGRESS NOTES
[facility] Progress Note    Admitting Date and Time: 12/7/2020  3:27 PM  Admit Dx: Complicated UTI (urinary tract infection) [X71.5]  Complicated UTI (urinary tract infection) [N39.0]    Subjective:  Patient is being followed for Complicated UTI (urinary tract infection) [O27.7]  Complicated UTI (urinary tract infection) [N39.0]     Assistance from  used during exam    Patient awake, alert, resting in bed in no acute distress  Reporting RLQ abdominal discomfort  C/o \" gas pains\"  No BM in 3 days  Ate a good breakfast  Denies any issues or pain around nephrostomy tubes  Denies nausea  Nursing at bedside         ROS: denies fever, chills, cp, sob, n/v, HA unless stated above.       sodium chloride flush  10 mL Intravenous 2 times per day    cefepime  2 g Intravenous Q12H    heparin (porcine)  5,000 Units Subcutaneous 3 times per day     sodium chloride flush, 10 mL, PRN  acetaminophen, 650 mg, Q6H PRN    Or  acetaminophen, 650 mg, Q6H PRN  polyethylene glycol, 17 g, Daily PRN         Objective:    BP (!) 106/53   Pulse 87   Temp 99.8 °F (37.7 °C) (Oral)   Resp 16   Ht 4' 1\" (1.245 m)   Wt 136 lb 2 oz (61.7 kg)   SpO2 97%   BMI 39.86 kg/m²   General Appearance: alert and oriented to person, place and time and in no acute distress  Skin: warm and dry  Head: normocephalic and atraumatic  Neck: neck supple and non tender without mass   Pulmonary/Chest: diminished throughout to auscultation   Cardiovascular: normal rate, normal S1 and S2 and no carotid bruits  Abdomen: soft, non-tender, non-distended, normal bowel sounds, no masses or organomegaly- bilateral nephrostomy tubes- clear yellow   Extremities: no cyanosis, no clubbing and no edema  Neurologic: speech normal         Recent Labs     12/07/20  1457 12/08/20  0705    136   K 3.6 3.8   CL 98 106   CO2 28 23   BUN 11 7*   CREATININE 0.6 0.5   GLUCOSE 139* 94   CALCIUM 9.9 9.2       Recent Labs     12/07/20  1457 12/08/20  0705   WBC 23.8* 23.1*   RBC 3.63 3.38*   HGB 10.4* 9.5*   HCT 33.1* 30.7*   MCV 91.2 90.8   MCH 28.7 28.1   MCHC 31.4* 30.9*   RDW 14.1 14.2   * 582*   MPV 9.1 9.4         Assessment:    Active Problems:    Complicated UTI (urinary tract infection)  Resolved Problems:    * No resolved hospital problems. *      Plan:  1. Cervical mass/ suspected cervical cancer: pt presented to the ER with concern of abdominal pain. Pt has been residing in [address] [place] [address] for 1 year. Pt recently was on vacation in [facility] where she had bilateral nephrostomy tubes placed and was told \" abdominal tumor. \" She had a biopsy done at that time. Concern that she had suspected cervical malignancy with bladder involvement. She then returned to [address] [place] [address] on 12/3. She has no PCP or follow up. He was discharged with no follow up or treatment plan as she was insistent that she wanted to return to [place] for treatment. OB/ GYN consulted- appreciate input. Pt requires GYN ONC service which we don't offer her. Recommendation was for pt to be transferred to tertiary center. Transfer line was called by attending and patient has been admitted at [facility]- awaiting bed. 2. Complicated UTI with bilateral nephrostomy tubes: UA reviewed. Continue cefepime. Follow culture. 3. S/p nephrostomy tubes due to obstruction. CT abdomen reviewed. 4. Constipation: bowel regimen        Dispo : transfer to [facility] when bed available. NOTE: This report was transcribed using voice recognition software. Every effort was made to ensure accuracy; however, inadvertent computerized transcription errors may be present.   Electronically signed by RUDOLPH Silveira on 12/8/2020 at 2:35 PM

## 2020-12-09 VITALS
SYSTOLIC BLOOD PRESSURE: 96 MMHG | WEIGHT: 136.13 LBS | BODY MASS INDEX: 31.51 KG/M2 | HEIGHT: 55 IN | RESPIRATION RATE: 17 BRPM | HEART RATE: 85 BPM | TEMPERATURE: 98.2 F | OXYGEN SATURATION: 99 % | DIASTOLIC BLOOD PRESSURE: 45 MMHG

## 2020-12-09 LAB
ANION GAP SERPL CALCULATED.3IONS-SCNC: 10 MMOL/L (ref 7–16)
BASOPHILS ABSOLUTE: 0.11 E9/L (ref 0–0.2)
BASOPHILS RELATIVE PERCENT: 0.4 % (ref 0–2)
BUN BLDV-MCNC: 7 MG/DL (ref 8–23)
CALCIUM SERPL-MCNC: 9.6 MG/DL (ref 8.6–10.2)
CHLORIDE BLD-SCNC: 101 MMOL/L (ref 98–107)
CO2: 22 MMOL/L (ref 22–29)
CREAT SERPL-MCNC: 0.5 MG/DL (ref 0.5–1)
EOSINOPHILS ABSOLUTE: 7.15 E9/L (ref 0.05–0.5)
EOSINOPHILS RELATIVE PERCENT: 29.2 % (ref 0–6)
GFR AFRICAN AMERICAN: >60
GFR NON-AFRICAN AMERICAN: >60 ML/MIN/1.73
GLUCOSE BLD-MCNC: 104 MG/DL (ref 74–99)
HCT VFR BLD CALC: 31.8 % (ref 34–48)
HEMOGLOBIN: 9.9 G/DL (ref 11.5–15.5)
IMMATURE GRANULOCYTES #: 0.11 E9/L
IMMATURE GRANULOCYTES %: 0.4 % (ref 0–5)
LYMPHOCYTES ABSOLUTE: 3.15 E9/L (ref 1.5–4)
LYMPHOCYTES RELATIVE PERCENT: 12.8 % (ref 20–42)
MCH RBC QN AUTO: 28.3 PG (ref 26–35)
MCHC RBC AUTO-ENTMCNC: 31.1 % (ref 32–34.5)
MCV RBC AUTO: 90.9 FL (ref 80–99.9)
MONOCYTES ABSOLUTE: 1.33 E9/L (ref 0.1–0.95)
MONOCYTES RELATIVE PERCENT: 5.4 % (ref 2–12)
NEUTROPHILS ABSOLUTE: 12.67 E9/L (ref 1.8–7.3)
NEUTROPHILS RELATIVE PERCENT: 51.8 % (ref 43–80)
PDW BLD-RTO: 14.3 FL (ref 11.5–15)
PLATELET # BLD: 609 E9/L (ref 130–450)
PMV BLD AUTO: 9.3 FL (ref 7–12)
POTASSIUM REFLEX MAGNESIUM: 4.6 MMOL/L (ref 3.5–5)
RBC # BLD: 3.5 E12/L (ref 3.5–5.5)
SODIUM BLD-SCNC: 133 MMOL/L (ref 132–146)
URINE CULTURE, ROUTINE: NORMAL
WBC # BLD: 24.5 E9/L (ref 4.5–11.5)

## 2020-12-09 PROCEDURE — 2580000003 HC RX 258: Performed by: INTERNAL MEDICINE

## 2020-12-09 PROCEDURE — 6360000002 HC RX W HCPCS: Performed by: INTERNAL MEDICINE

## 2020-12-09 PROCEDURE — 80048 BASIC METABOLIC PNL TOTAL CA: CPT

## 2020-12-09 PROCEDURE — 6370000000 HC RX 637 (ALT 250 FOR IP): Performed by: INTERNAL MEDICINE

## 2020-12-09 PROCEDURE — 85025 COMPLETE CBC W/AUTO DIFF WBC: CPT

## 2020-12-09 PROCEDURE — 6370000000 HC RX 637 (ALT 250 FOR IP): Performed by: NURSE PRACTITIONER

## 2020-12-09 PROCEDURE — 36415 COLL VENOUS BLD VENIPUNCTURE: CPT

## 2020-12-09 RX ADMIN — CEFEPIME 2 G: 2 INJECTION, POWDER, FOR SOLUTION INTRAVENOUS at 07:28

## 2020-12-09 RX ADMIN — MAGNESIUM HYDROXIDE 30 ML: 400 SUSPENSION ORAL at 09:03

## 2020-12-09 RX ADMIN — SODIUM CHLORIDE: 9 INJECTION, SOLUTION INTRAVENOUS at 01:21

## 2020-12-09 RX ADMIN — ACETAMINOPHEN 650 MG: 325 TABLET ORAL at 09:02

## 2020-12-09 NOTE — PROGRESS NOTES
Spoken with family on multiple occassions regarding patient. Family questioning why patient's IVs keep coming out and why patient hasn't had her nephrostomy tube dressings changed. Explained to them that patient just arrived on our floor at 1900 and I could not answer those questions regarding her previous floor and her care there.

## 2020-12-09 NOTE — PATIENT CARE CONFERENCE
[facility] Quality Flow/Interdisciplinary Rounds Progress Note        Quality Flow Rounds held on December 9, 2020    Disciplines Attending:  ,  and Nursing Unit Leadership    Rich El was admitted on 12/7/2020  3:27 PM    Anticipated Discharge Date:       Disposition:    Rafael Score:  Rafael Scale Score: 18    Readmission Risk              Risk of Unplanned Readmission:        7           Discussed patient goal for the day, patient clinical progression, and barriers to discharge.   The following Goal(s) of the Day/Commitment(s) have been identified:  Discharge - Obtain Order      Kayla Anderson  December 9, 2020

## 2020-12-09 NOTE — DISCHARGE INSTR - COC
Continuity of Care Form    Patient Name: Dawood Tapia   :  1945  MRN:  [id number]    Admit date:  2020  Discharge date:  ***    Code Status Order: Full Code   Advance Directives:     Admitting Physician:  Michelle Aguilar MD  PCP: No primary care provider on file. Discharging Nurse: [facility] Unit/Room#: 3880/4945-Z  Discharging Unit Phone Number: ***    Emergency Contact:   Extended Emergency Contact Information  Primary Emergency Contact: Antonio Minaya, [address] Phone: [phone]  Relation: Child    Past Surgical History:  No past surgical history on file. Immunization History: There is no immunization history on file for this patient.     Active Problems:  Patient Active Problem List   Diagnosis Code    Complicated UTI (urinary tract infection) N39.0    Cervical mass N88.8       Isolation/Infection:   Isolation          No Isolation        Patient Infection Status     None to display          Nurse Assessment:  Last Vital Signs: BP (!) 112/56   Pulse 94   Temp 97.8 °F (36.6 °C) (Oral)   Resp 18   Ht 4' 1\" (1.245 m)   Wt 136 lb 2 oz (61.7 kg)   SpO2 98%   BMI 39.86 kg/m²     Last documented pain score (0-10 scale): Pain Level: 0  Last Weight:   Wt Readings from Last 1 Encounters:   20 136 lb 2 oz (61.7 kg)     Mental Status:  oriented and alert    IV Access:  - Peripheral IV - site  Left forearm, insertion date: 20    Nursing Mobility/ADLs:  Walking   Assisted  Transfer  Assisted  Bathing  Assisted  Dressing  Assisted  Toileting  Assisted  Feeding  Independent  Med Admin  Independent  Med Delivery   whole    Wound Care Documentation and Therapy:        Elimination:  Continence:   · Bowel: {YES / NN:17826}  · Bladder: {YES / VR:24290}  Urinary Catheter: None   Colostomy/Ileostomy/Ileal Conduit: {YES / IK:94945}       Date of Last BM: ***    Intake/Output Summary (Last 24 hours) at 2020 1145  Last data filed at 2020 1052  Gross per 24 hour Intake 992 ml   Output 2525 ml   Net -1533 ml     I/O last 3 completed shifts: In: 320 [P.O.:120; I.V.:632; IV Piggyback:50]  Out: 2500 [Urine:2500]    Safety Concerns: At Risk for Falls    Impairments/Disabilities:      Language Barrier - ***    Nutrition Therapy:  Current Nutrition Therapy:   - Oral Diet:  General    Routes of Feeding: Oral  Liquids: {Slp liquid thickness:80389}  Daily Fluid Restriction: no  Last Modified Barium Swallow with Video (Video Swallowing Test): not done    Treatments at the Time of Hospital Discharge:   Respiratory Treatments: ***  Oxygen Therapy:  is not on home oxygen therapy.   Ventilator:    - No ventilator support    Rehab Therapies: {THERAPEUTIC INTERVENTION:0828171634}  Weight Bearing Status/Restrictions: No weight bearing restirctions  Other Medical Equipment (for information only, NOT a DME order):  {EQUIPMENT:432823044}  Other Treatments: ***    Patient's personal belongings (please select all that are sent with patient):  personal belongings    RN SIGNATURE:  Electronically signed by Star Milan RN on 12/9/20 at 11:49 AM EST    CASE MANAGEMENT/SOCIAL WORK SECTION    Inpatient Status Date: ***    Readmission Risk Assessment Score:  Readmission Risk              Risk of Unplanned Readmission:        7           Discharging to Facility/ Agency   · Name:   · Address:  · Phone:  · Fax:    Dialysis Facility (if applicable)   · Name:  · Address:  · Dialysis Schedule:  · Phone:  · Fax:    / signature: {Esignature:592465940}    PHYSICIAN SECTION    Prognosis: {Prognosis:8700961999}    Condition at Discharge: [address] [place] [address] Patient Condition:155587072}    Rehab Potential (if transferring to Rehab): {Prognosis:0132997579}    Recommended Labs or Other Treatments After Discharge: ***    Physician Certification: I certify the above information and transfer of Nica Prince  is necessary for the continuing treatment of the diagnosis listed and that she requires {Admit to Appropriate Level of Care:80662} for {GREATER/LESS:920163479} 30 days.      Update Admission H&P: {CHP DME Changes in NPJOA:384377948}    PHYSICIAN SIGNATURE:  {Esignature:113985921}

## 2020-12-09 NOTE — PROGRESS NOTES
Spoke with [facility]- they are able to have [address] transport patient. They will be here within 30 minutes.   Electronically signed by Bautista Ceja RN on 12/9/2020 at 11:17 AM

## 2020-12-12 LAB
BLOOD CULTURE, ROUTINE: NORMAL
CULTURE, BLOOD 2: NORMAL

## 2020-12-14 NOTE — DISCHARGE SUMMARY
She was accepted for discharge on 12/8/20 . Please refer to discharge summary dated yesterday. Patient was seen /examined and followed today. No change in status. She is discharged to [facility] in stable status. Family aware of this.

## 2021-01-17 ENCOUNTER — HOSPITAL ENCOUNTER (INPATIENT)
Age: 76
LOS: 3 days | Discharge: ANOTHER HOSPITAL | DRG: 463 | End: 2021-01-20
Attending: EMERGENCY MEDICINE | Admitting: STUDENT IN AN ORGANIZED HEALTH CARE EDUCATION/TRAINING PROGRAM
Payer: MEDICAID

## 2021-01-17 ENCOUNTER — APPOINTMENT (OUTPATIENT)
Dept: GENERAL RADIOLOGY | Age: 76
DRG: 463 | End: 2021-01-17
Payer: MEDICAID

## 2021-01-17 DIAGNOSIS — E87.1 HYPONATREMIA: Primary | ICD-10-CM

## 2021-01-17 DIAGNOSIS — N30.00 ACUTE CYSTITIS WITHOUT HEMATURIA: ICD-10-CM

## 2021-01-17 LAB
ALBUMIN SERPL-MCNC: 3 G/DL (ref 3.5–5.2)
ALP BLD-CCNC: 172 U/L (ref 35–104)
ALT SERPL-CCNC: 18 U/L (ref 0–32)
ANION GAP SERPL CALCULATED.3IONS-SCNC: 5 MMOL/L (ref 7–16)
AST SERPL-CCNC: 31 U/L (ref 0–31)
ATYPICAL LYMPHOCYTE RELATIVE PERCENT: 0.9 % (ref 0–4)
BACTERIA: ABNORMAL /HPF
BACTERIA: ABNORMAL /HPF
BASOPHILS ABSOLUTE: 0 E9/L (ref 0–0.2)
BASOPHILS RELATIVE PERCENT: 0 % (ref 0–2)
BILIRUB SERPL-MCNC: 0.5 MG/DL (ref 0–1.2)
BILIRUBIN URINE: NEGATIVE
BILIRUBIN URINE: NEGATIVE
BLOOD, URINE: ABNORMAL
BLOOD, URINE: ABNORMAL
BUN BLDV-MCNC: 13 MG/DL (ref 8–23)
CALCIUM SERPL-MCNC: 9.2 MG/DL (ref 8.6–10.2)
CHLORIDE BLD-SCNC: 92 MMOL/L (ref 98–107)
CLARITY: CLEAR
CLARITY: CLEAR
CO2: 25 MMOL/L (ref 22–29)
COLOR: YELLOW
COLOR: YELLOW
CREAT SERPL-MCNC: 0.4 MG/DL (ref 0.5–1)
EOSINOPHILS ABSOLUTE: 2.25 E9/L (ref 0.05–0.5)
EOSINOPHILS RELATIVE PERCENT: 8.8 % (ref 0–6)
GFR AFRICAN AMERICAN: >60
GFR NON-AFRICAN AMERICAN: >60 ML/MIN/1.73
GLUCOSE BLD-MCNC: 129 MG/DL (ref 74–99)
GLUCOSE URINE: NEGATIVE MG/DL
GLUCOSE URINE: NEGATIVE MG/DL
HCT VFR BLD CALC: 30.3 % (ref 34–48)
HEMOGLOBIN: 10.2 G/DL (ref 11.5–15.5)
KETONES, URINE: NEGATIVE MG/DL
KETONES, URINE: NEGATIVE MG/DL
LACTIC ACID: 1.4 MMOL/L (ref 0.5–2.2)
LEUKOCYTE ESTERASE, URINE: ABNORMAL
LEUKOCYTE ESTERASE, URINE: ABNORMAL
LYMPHOCYTES ABSOLUTE: 1.02 E9/L (ref 1.5–4)
LYMPHOCYTES RELATIVE PERCENT: 2.6 % (ref 20–42)
MCH RBC QN AUTO: 29 PG (ref 26–35)
MCHC RBC AUTO-ENTMCNC: 33.7 % (ref 32–34.5)
MCV RBC AUTO: 86.1 FL (ref 80–99.9)
MONOCYTES ABSOLUTE: 1.54 E9/L (ref 0.1–0.95)
MONOCYTES RELATIVE PERCENT: 6.1 % (ref 2–12)
NEUTROPHILS ABSOLUTE: 20.99 E9/L (ref 1.8–7.3)
NEUTROPHILS RELATIVE PERCENT: 81.6 % (ref 43–80)
NITRITE, URINE: POSITIVE
NITRITE, URINE: POSITIVE
NUCLEATED RED BLOOD CELLS: 0 /100 WBC
PDW BLD-RTO: 14 FL (ref 11.5–15)
PH UA: 6.5 (ref 5–9)
PH UA: 6.5 (ref 5–9)
PLATELET # BLD: 751 E9/L (ref 130–450)
PMV BLD AUTO: 8.6 FL (ref 7–12)
POTASSIUM REFLEX MAGNESIUM: 4.2 MMOL/L (ref 3.5–5)
PROTEIN UA: 100 MG/DL
PROTEIN UA: 100 MG/DL
RBC # BLD: 3.52 E12/L (ref 3.5–5.5)
RBC # BLD: NORMAL 10*6/UL
RBC UA: ABNORMAL /HPF (ref 0–2)
RBC UA: ABNORMAL /HPF (ref 0–2)
SARS-COV-2, NAAT: NOT DETECTED
SODIUM BLD-SCNC: 122 MMOL/L (ref 132–146)
SPECIFIC GRAVITY UA: 1.02 (ref 1–1.03)
SPECIFIC GRAVITY UA: 1.02 (ref 1–1.03)
TOTAL PROTEIN: 7 G/DL (ref 6.4–8.3)
UROBILINOGEN, URINE: 0.2 E.U./DL
UROBILINOGEN, URINE: 0.2 E.U./DL
WBC # BLD: 25.6 E9/L (ref 4.5–11.5)
WBC UA: >20 /HPF (ref 0–5)
WBC UA: >20 /HPF (ref 0–5)

## 2021-01-17 PROCEDURE — 2580000003 HC RX 258: Performed by: GENERAL PRACTICE

## 2021-01-17 PROCEDURE — 81001 URINALYSIS AUTO W/SCOPE: CPT

## 2021-01-17 PROCEDURE — 99285 EMERGENCY DEPT VISIT HI MDM: CPT

## 2021-01-17 PROCEDURE — 84295 ASSAY OF SERUM SODIUM: CPT

## 2021-01-17 PROCEDURE — 87040 BLOOD CULTURE FOR BACTERIA: CPT

## 2021-01-17 PROCEDURE — 87088 URINE BACTERIA CULTURE: CPT

## 2021-01-17 PROCEDURE — 85025 COMPLETE CBC W/AUTO DIFF WBC: CPT

## 2021-01-17 PROCEDURE — 36415 COLL VENOUS BLD VENIPUNCTURE: CPT

## 2021-01-17 PROCEDURE — 80053 COMPREHEN METABOLIC PANEL: CPT

## 2021-01-17 PROCEDURE — 1200000000 HC SEMI PRIVATE

## 2021-01-17 PROCEDURE — 2580000003 HC RX 258: Performed by: INTERNAL MEDICINE

## 2021-01-17 PROCEDURE — 71045 X-RAY EXAM CHEST 1 VIEW: CPT

## 2021-01-17 PROCEDURE — 83605 ASSAY OF LACTIC ACID: CPT

## 2021-01-17 PROCEDURE — U0002 COVID-19 LAB TEST NON-CDC: HCPCS

## 2021-01-17 PROCEDURE — 6360000002 HC RX W HCPCS: Performed by: GENERAL PRACTICE

## 2021-01-17 PROCEDURE — 99223 1ST HOSP IP/OBS HIGH 75: CPT | Performed by: INTERNAL MEDICINE

## 2021-01-17 RX ORDER — SODIUM CHLORIDE 9 MG/ML
INJECTION, SOLUTION INTRAVENOUS CONTINUOUS
Status: DISCONTINUED | OUTPATIENT
Start: 2021-01-17 | End: 2021-01-19

## 2021-01-17 RX ORDER — ACETAMINOPHEN 650 MG/1
650 SUPPOSITORY RECTAL EVERY 6 HOURS PRN
Status: DISCONTINUED | OUTPATIENT
Start: 2021-01-17 | End: 2021-01-20 | Stop reason: HOSPADM

## 2021-01-17 RX ORDER — ACETAMINOPHEN 325 MG/1
650 TABLET ORAL EVERY 6 HOURS PRN
Status: DISCONTINUED | OUTPATIENT
Start: 2021-01-17 | End: 2021-01-20 | Stop reason: HOSPADM

## 2021-01-17 RX ORDER — SODIUM CHLORIDE 0.9 % (FLUSH) 0.9 %
10 SYRINGE (ML) INJECTION EVERY 12 HOURS SCHEDULED
Status: DISCONTINUED | OUTPATIENT
Start: 2021-01-17 | End: 2021-01-20 | Stop reason: HOSPADM

## 2021-01-17 RX ORDER — SODIUM CHLORIDE 0.9 % (FLUSH) 0.9 %
10 SYRINGE (ML) INJECTION PRN
Status: DISCONTINUED | OUTPATIENT
Start: 2021-01-17 | End: 2021-01-20 | Stop reason: HOSPADM

## 2021-01-17 RX ORDER — POLYETHYLENE GLYCOL 3350 17 G/17G
17 POWDER, FOR SOLUTION ORAL DAILY PRN
Status: DISCONTINUED | OUTPATIENT
Start: 2021-01-17 | End: 2021-01-20 | Stop reason: HOSPADM

## 2021-01-17 RX ADMIN — SODIUM CHLORIDE, PRESERVATIVE FREE 10 ML: 5 INJECTION INTRAVENOUS at 21:53

## 2021-01-17 RX ADMIN — CEFTRIAXONE 1 G: 1 INJECTION, POWDER, FOR SOLUTION INTRAMUSCULAR; INTRAVENOUS at 20:23

## 2021-01-17 RX ADMIN — SODIUM CHLORIDE: 9 INJECTION, SOLUTION INTRAVENOUS at 21:52

## 2021-01-17 ASSESSMENT — ENCOUNTER SYMPTOMS
COUGH: 0
EYE PAIN: 0
WHEEZING: 0
ABDOMINAL PAIN: 0
CHEST TIGHTNESS: 0
SHORTNESS OF BREATH: 0
NAUSEA: 0
CHOKING: 0
VOMITING: 0
DIARRHEA: 0
SINUS PAIN: 0
SORE THROAT: 0

## 2021-01-17 NOTE — PROGRESS NOTES
Daughter, Nataliya Martinez requesting to speak to RN. RN not available.  Requesting simeon to: [phone]

## 2021-01-17 NOTE — ED PROVIDER NOTES
ED  Provider Note  Admit Date/RoomTime: 1/17/2021  4:04 PM  ED Room: 28/28     HPI:   Willam Branch is a 66 y.o. female presenting to the ED for fever, beginning hours ago. The complaint has been intermittent, mild in severity, and worsened by nothing. Patient is a Serbian-speaking only patient who presents with a reported fever from family as reported by a visiting home health nurse. Past medical history significant for pelvic cancer (undergoing chemotherapy), bilateral nephrostomy tubes. Patient states that she is in no pain or discomfort, is not experiencing any fever, and has no complaints. Patient states that she is unclear why she is present in the emergency department today. Review of Systems   Constitutional: Negative for appetite change, chills and fever. HENT: Negative for sinus pain and sore throat. Eyes: Negative for pain and visual disturbance. Respiratory: Negative for cough, choking, chest tightness, shortness of breath and wheezing. Cardiovascular: Negative for chest pain and palpitations. Gastrointestinal: Negative for abdominal pain, diarrhea, nausea and vomiting. Genitourinary: Negative for hematuria. Musculoskeletal: Negative for neck pain and neck stiffness. Skin: Negative for rash. Neurological: Negative for tremors, syncope and weakness. Psychiatric/Behavioral: Negative for confusion. Physical Exam  Vitals signs and nursing note reviewed. Constitutional:       Appearance: She is well-developed. HENT:      Head: Normocephalic and atraumatic. Eyes:      Pupils: Pupils are equal, round, and reactive to light. Neck:      Musculoskeletal: Normal range of motion and neck supple. Cardiovascular:      Rate and Rhythm: Normal rate and regular rhythm. Pulmonary:      Effort: Pulmonary effort is normal. No respiratory distress. Breath sounds: Normal breath sounds. No wheezing or rales.    Abdominal:      General: Bowel sounds are pH, UA 6.5 5.0 - 9.0    Protein,  (A) Negative mg/dL    Urobilinogen, Urine 0.2 <2.0 E.U./dL    Nitrite, Urine POSITIVE (A) Negative    Leukocyte Esterase, Urine LARGE (A) Negative   COVID-19   Result Value Ref Range    SARS-CoV-2, NAAT Not Detected Not Detected   URINALYSIS   Result Value Ref Range    Color, UA Yellow Straw/Yellow    Clarity, UA Clear Clear    Glucose, Ur Negative Negative mg/dL    Bilirubin Urine Negative Negative    Ketones, Urine Negative Negative mg/dL    Specific Gravity, UA 1.025 1.005 - 1.030    Blood, Urine LARGE (A) Negative    pH, UA 6.5 5.0 - 9.0    Protein,  (A) Negative mg/dL    Urobilinogen, Urine 0.2 <2.0 E.U./dL    Nitrite, Urine POSITIVE (A) Negative    Leukocyte Esterase, Urine MODERATE (A) Negative   Microscopic Urinalysis   Result Value Ref Range    WBC, UA >20 (A) 0 - 5 /HPF    RBC, UA 5-10 (A) 0 - 2 /HPF    Bacteria, UA MANY (A) None Seen /HPF   Microscopic Urinalysis   Result Value Ref Range    WBC, UA >20 (A) 0 - 5 /HPF    RBC, UA 0-1 0 - 2 /HPF    Bacteria, UA MANY (A) None Seen /HPF       RADIOLOGY:  XR CHEST PORTABLE   Final Result   No acute process. ------------------------- NURSING NOTES AND VITALS REVIEWED ---------------------------  Date / Time Roomed:  1/17/2021  4:04 PM  ED Bed Assignment:  [id number]-F    The nursing notes within the ED encounter and vital signs as below have been reviewed.      Patient Vitals for the past 24 hrs:   BP Temp Temp src Pulse Resp SpO2 Height Weight   01/17/21 2136 (!) 104/54 98.4 °F (36.9 °C) Oral 94 16 97 % -- --   01/17/21 2008 (!) 117/58 98.4 °F (36.9 °C) Oral 97 14 96 % -- --   01/17/21 1950 (!) 118/52 -- -- 95 16 98 % -- --   01/17/21 1845 (!) 123/52 98 °F (36.7 °C) -- 97 18 98 % -- --   01/17/21 1633 102/60 98.2 °F (36.8 °C) Oral 104 20 98 % 5' 2\" (1.575 m) 136 lb (61.7 kg)       Oxygen Saturation Interpretation: Normal    ------------------------------------------ PROGRESS NOTES ------------------------------------------  Re-evaluation(s):  Patients symptoms show no change  Repeat physical examination is not changed    Counseling:  I have spoken with the patient and discussed todays results, in addition to providing specific details for the plan of care and counseling regarding the diagnosis and prognosis. Their questions are answered at this time and they are agreeable with the plan of admission.    --------------------------------- ADDITIONAL PROVIDER NOTES ---------------------------------  Consultations:  Time: 12:15 AM EST. Spoke with Dr. Adali Love. Discussed case. They will admit the patient. This patient's ED course included: a personal history and physicial examination, re-evaluation prior to disposition, multiple bedside re-evaluations, IV medications, continuous pulse oximetry and complex medical decision making and emergency management    This patient has remained hemodynamically stable during their ED course. Diagnosis:  1. Hyponatremia    2. Fever, unspecified fever cause        Disposition:  Patient's disposition: Admit to med/surg floor  Patient's condition is fair.                    Gino Pizano 41., DO  Resident  01/18/21 6768

## 2021-01-17 NOTE — ED NOTES
Bed: 28  Expected date:   Expected time:   Means of arrival:   Comments:  EMS     Patricia Still RN  01/17/21 9914

## 2021-01-18 LAB
ANION GAP SERPL CALCULATED.3IONS-SCNC: 13 MMOL/L (ref 7–16)
ANION GAP SERPL CALCULATED.3IONS-SCNC: 8 MMOL/L (ref 7–16)
BASOPHILS ABSOLUTE: 0.12 E9/L (ref 0–0.2)
BASOPHILS RELATIVE PERCENT: 0.5 % (ref 0–2)
BUN BLDV-MCNC: 11 MG/DL (ref 8–23)
BUN BLDV-MCNC: 11 MG/DL (ref 8–23)
CALCIUM SERPL-MCNC: 8.8 MG/DL (ref 8.6–10.2)
CALCIUM SERPL-MCNC: 9 MG/DL (ref 8.6–10.2)
CHLORIDE BLD-SCNC: 92 MMOL/L (ref 98–107)
CHLORIDE BLD-SCNC: 95 MMOL/L (ref 98–107)
CHLORIDE URINE RANDOM: 139 MMOL/L
CO2: 19 MMOL/L (ref 22–29)
CO2: 24 MMOL/L (ref 22–29)
CREAT SERPL-MCNC: 0.4 MG/DL (ref 0.5–1)
CREAT SERPL-MCNC: 0.4 MG/DL (ref 0.5–1)
EOSINOPHILS ABSOLUTE: 2.26 E9/L (ref 0.05–0.5)
EOSINOPHILS RELATIVE PERCENT: 9.6 % (ref 0–6)
GFR AFRICAN AMERICAN: >60
GFR AFRICAN AMERICAN: >60
GFR NON-AFRICAN AMERICAN: >60 ML/MIN/1.73
GFR NON-AFRICAN AMERICAN: >60 ML/MIN/1.73
GLUCOSE BLD-MCNC: 109 MG/DL (ref 74–99)
GLUCOSE BLD-MCNC: 111 MG/DL (ref 74–99)
HCT VFR BLD CALC: 30.2 % (ref 34–48)
HEMOGLOBIN: 9.6 G/DL (ref 11.5–15.5)
IMMATURE GRANULOCYTES #: 0.51 E9/L
IMMATURE GRANULOCYTES %: 2.2 % (ref 0–5)
LACTIC ACID: 1.3 MMOL/L (ref 0.5–2.2)
LYMPHOCYTES ABSOLUTE: 0.99 E9/L (ref 1.5–4)
LYMPHOCYTES RELATIVE PERCENT: 4.2 % (ref 20–42)
MCH RBC QN AUTO: 27.8 PG (ref 26–35)
MCHC RBC AUTO-ENTMCNC: 31.8 % (ref 32–34.5)
MCV RBC AUTO: 87.5 FL (ref 80–99.9)
MONOCYTES ABSOLUTE: 1.71 E9/L (ref 0.1–0.95)
MONOCYTES RELATIVE PERCENT: 7.3 % (ref 2–12)
NEUTROPHILS ABSOLUTE: 17.93 E9/L (ref 1.8–7.3)
NEUTROPHILS RELATIVE PERCENT: 76.2 % (ref 43–80)
OSMOLALITY URINE: 637 MOSM/KG (ref 300–900)
PDW BLD-RTO: 14.1 FL (ref 11.5–15)
PLATELET # BLD: 740 E9/L (ref 130–450)
PMV BLD AUTO: 8.7 FL (ref 7–12)
POTASSIUM REFLEX MAGNESIUM: 4.2 MMOL/L (ref 3.5–5)
POTASSIUM SERPL-SCNC: 4.1 MMOL/L (ref 3.5–5)
POTASSIUM, UR: 39 MMOL/L
RBC # BLD: 3.45 E12/L (ref 3.5–5.5)
SODIUM BLD-SCNC: 123 MMOL/L (ref 132–146)
SODIUM BLD-SCNC: 124 MMOL/L (ref 132–146)
SODIUM BLD-SCNC: 124 MMOL/L (ref 132–146)
SODIUM BLD-SCNC: 127 MMOL/L (ref 132–146)
SODIUM URINE: 168 MMOL/L
URIC ACID, SERUM: 2.1 MG/DL (ref 2.4–5.7)
URINE CULTURE, ROUTINE: NORMAL
WBC # BLD: 23.5 E9/L (ref 4.5–11.5)

## 2021-01-18 PROCEDURE — 82436 ASSAY OF URINE CHLORIDE: CPT

## 2021-01-18 PROCEDURE — 36415 COLL VENOUS BLD VENIPUNCTURE: CPT

## 2021-01-18 PROCEDURE — 83605 ASSAY OF LACTIC ACID: CPT

## 2021-01-18 PROCEDURE — 84550 ASSAY OF BLOOD/URIC ACID: CPT

## 2021-01-18 PROCEDURE — 2580000003 HC RX 258: Performed by: INTERNAL MEDICINE

## 2021-01-18 PROCEDURE — 84295 ASSAY OF SERUM SODIUM: CPT

## 2021-01-18 PROCEDURE — 1200000000 HC SEMI PRIVATE

## 2021-01-18 PROCEDURE — 83935 ASSAY OF URINE OSMOLALITY: CPT

## 2021-01-18 PROCEDURE — 80048 BASIC METABOLIC PNL TOTAL CA: CPT

## 2021-01-18 PROCEDURE — 6370000000 HC RX 637 (ALT 250 FOR IP): Performed by: INTERNAL MEDICINE

## 2021-01-18 PROCEDURE — 6360000002 HC RX W HCPCS: Performed by: INTERNAL MEDICINE

## 2021-01-18 PROCEDURE — 99232 SBSQ HOSP IP/OBS MODERATE 35: CPT | Performed by: STUDENT IN AN ORGANIZED HEALTH CARE EDUCATION/TRAINING PROGRAM

## 2021-01-18 PROCEDURE — 84133 ASSAY OF URINE POTASSIUM: CPT

## 2021-01-18 PROCEDURE — 85025 COMPLETE CBC W/AUTO DIFF WBC: CPT

## 2021-01-18 PROCEDURE — 84300 ASSAY OF URINE SODIUM: CPT

## 2021-01-18 RX ORDER — SIMETHICONE 80 MG
80 TABLET,CHEWABLE ORAL EVERY 6 HOURS PRN
Status: DISCONTINUED | OUTPATIENT
Start: 2021-01-18 | End: 2021-01-20 | Stop reason: HOSPADM

## 2021-01-18 RX ADMIN — SIMETHICONE 80 MG: 80 TABLET, CHEWABLE ORAL at 09:27

## 2021-01-18 RX ADMIN — CEFTRIAXONE 1 G: 1 INJECTION, POWDER, FOR SOLUTION INTRAMUSCULAR; INTRAVENOUS at 19:59

## 2021-01-18 RX ADMIN — SIMETHICONE 80 MG: 80 TABLET, CHEWABLE ORAL at 00:34

## 2021-01-18 RX ADMIN — SODIUM CHLORIDE: 9 INJECTION, SOLUTION INTRAVENOUS at 11:45

## 2021-01-18 NOTE — PROGRESS NOTES
[facility] Progress Note    Admitting Date and Time: 1/17/2021  4:04 PM  Admit Dx: Hyponatremia [E87.1]    Subjective:  Patient is being followed for Hyponatremia [E87.1]   Pt is a Bulgarian speaking lady with daughter as caretaker who speaks and understands Ivon Quezadau. Per RN: urine for studies unobtainable from nephrostomy tubes. ROS: denies fever, chills, cp, sob, n/v, HA unless stated above.  sodium chloride flush  10 mL Intravenous 2 times per day    enoxaparin  40 mg Subcutaneous Daily    cefTRIAXone (ROCEPHIN) IV  1 g Intravenous Q24H         simethicone, 80 mg, Q6H PRN      sodium chloride flush, 10 mL, PRN      polyethylene glycol, 17 g, Daily PRN      acetaminophen, 650 mg, Q6H PRN    Or      acetaminophen, 650 mg, Q6H PRN         Objective:    BP (!) 112/54   Pulse 92   Temp 99.1 °F (37.3 °C) (Oral)   Resp 16   Ht 5' 2\" (1.575 m)   Wt 136 lb (61.7 kg)   SpO2 99%   BMI 24.87 kg/m²     General Appearance: alert and oriented to person, place and time and in no acute distress  Skin: warm and dry  Head: normocephalic and atraumatic  Eyes: pupils equal, round, and reactive to light, extraocular eye movements intact, conjunctivae normal  Neck: neck supple and non tender without mass   Pulmonary/Chest: clear to auscultation bilaterally- no wheezes, rales or rhonchi, normal air movement, no respiratory distress  Cardiovascular: normal rate, normal S1 and S2 and no carotid bruits  Abdomen: soft, non-tender, non-distended, normal bowel sounds, no masses or organomegaly, has b/l nephrostomy tubes draining clear urine.   Extremities: no cyanosis, no clubbing and no edema  Neurologic: no cranial nerve deficit and speech normal        Recent Labs     01/17/21  1725 01/17/21  2355 01/18/21  0652   * 123* 124*   K 4.2  --  4.2   CL 92*  --  92*   CO2 25  --  24   BUN 13  --  11   CREATININE 0.4*  --  0.4*   GLUCOSE 129*  --  109*   CALCIUM 9.2  --  9.0       Recent Labs

## 2021-01-18 NOTE — CARE COORDINATION
Called and spoke with patient's daughter and granddaughter. Pt speaks no english. Pt is actively receiving chemo and radiation from [facility] for her cancer. Pt has transportation through her medicaid. Pt lives with daughter, they have a home care company, unsure of name. I attempted to call and contact the individual that granddaughter has number for, left generic vm to return call. Will continue to follow.-[facility] [place], [place]    Return call from [address] called back.  [employer] is the home care company-[facility] [place], [place]

## 2021-01-18 NOTE — H&P
[facility] History and Physical      CHIEF COMPLAINT:  abd pain fever    History of Present Illness:  67 yo f hx of uterine cancer follows at [address], urinary obstruction has bilateral nephrostomy tubes. States last night she developed left-sided abdominal gas and a fever. Presented ED for further evaluation. Even with a  she is not very forthcoming with answering questions and denies all complaints. In the ED found to have hyponatremia therefore referred for admission. Informant(s) for H&P: Patient through     REVIEW OF SYSTEMS:  A comprehensive 14 point review of systems was negative except for: what is in the HPI    PMH:  Past Medical History:   Diagnosis Date    Cancer [facility])        Surgical History:  Past Surgical History:   Procedure Laterality Date    APPENDECTOMY         Medications Prior to Admission:    Prior to Admission medications    Medication Sig Start Date End Date Taking? Authorizing Provider   acetaminophen (TYLENOL) 325 MG tablet Take 650 mg by mouth every 6 hours as needed for Pain    Historical Provider, MD       Allergies:    Patient has no known allergies. Social History:    reports that she has never smoked. She has never used smokeless tobacco. She reports previous alcohol use. She reports previous drug use. Family History:    Mother: Cancer      PHYSICAL EXAM:  Vitals:  BP (!) 117/58   Pulse 97   Temp 98.4 °F (36.9 °C) (Oral)   Resp 14   Ht 5' 2\" (1.575 m)   Wt 136 lb (61.7 kg)   SpO2 96%   BMI 24.87 kg/m²   General Appearance: alert and oriented to person, place and time and in no acute distress  Skin: warm and dry, turgor not diminished  Head: normocephalic and atraumatic  Eyes: pupils equal, round, and reactive to light, extraocular eye movements intact, conjunctivae normal  Neck: neck supple and non tender without mass   Pulmonary/Chest: clear to auscultation bilaterally- no wheezes, rales or rhonchi, normal air movement, no respiratory distress  Cardiovascular: normal rate, normal S1 and S2 and no M/R/R  Abdomen: soft, non-tender, non-distended, normal bowel sounds, no masses or organomegaly nephrostomy tubes draining clear urine  Extremities: no cyanosis, no clubbing and no edema  Neurologic: no cranial nerve deficit and speech normal    LABS:  Recent Labs     01/17/21  1725   *   K 4.2   CL 92*   CO2 25   BUN 13   CREATININE 0.4*   GLUCOSE 129*   CALCIUM 9.2       Recent Labs     01/17/21  1725   WBC 25.6*   RBC 3.52   HGB 10.2*   HCT 30.3*   MCV 86.1   MCH 29.0   MCHC 33.7   RDW 14.0   *   MPV 8.6       No results for input(s): POCGLU in the last 72 hours. Radiology:   XR CHEST PORTABLE   Final Result   No acute process. ASSESSMENT:      Active Problems:    Hyponatremia  Leukocytosis: Appears chronic  Cervical cancer on chemo: Unclear regimen  ? Fever  Urinary obstruction status post nephrostomy tube    PLAN:  Hyponatremia: IV fluids and trend. Check urine studies. ? Fever: Urine and blood cultures. Continue Rocephin for now. Monitor fever trend. DVT prophylaxis: lovenox      NOTE: This report was transcribed using voice recognition software. Every effort was made to ensure accuracy; however, inadvertent computerized transcription errors may be present.   Electronically signed by Montse Vora MD on 1/17/2021 at 8:52 PM

## 2021-01-18 NOTE — PROGRESS NOTES
P Quality Flow/Interdisciplinary Rounds Progress Note        Quality Flow Rounds held on January 18, 2021    Disciplines Attending:  Bedside Nurse, ,  and Nursing Unit Leadership    Norma Benedict was admitted on 1/17/2021  4:04 PM    Anticipated Discharge Date:       Disposition:    Rafael Score:  Rafael Scale Score: 20    Readmission Risk              Risk of Unplanned Readmission:        11           Discussed patient goal for the day, patient clinical progression, and barriers to discharge.   The following Goal(s) of the Day/Commitment(s) have been identified:  Discharge [address] [place]  January 18, 2021

## 2021-01-18 NOTE — ED NOTES
Called [facility] and made them aware SBAR was faxed.       Matheus Mata RN  01/17/21 2017

## 2021-01-18 NOTE — ED NOTES
Called patients Daughter Evy Kenney at this time, and made the family aware of the plan of care.       Dax Urias RN  01/17/21 2012

## 2021-01-18 NOTE — PROGRESS NOTES
Consult for hyponatremia called to Dr. Devendra aTylor.   Adam Chapa  1/18/2021  11:22 AM DISPLAY PLAN FREE TEXT

## 2021-01-19 VITALS
HEIGHT: 62 IN | TEMPERATURE: 99 F | SYSTOLIC BLOOD PRESSURE: 110 MMHG | RESPIRATION RATE: 17 BRPM | HEART RATE: 98 BPM | WEIGHT: 136 LBS | DIASTOLIC BLOOD PRESSURE: 60 MMHG | OXYGEN SATURATION: 97 % | BODY MASS INDEX: 25.03 KG/M2

## 2021-01-19 LAB
ANION GAP SERPL CALCULATED.3IONS-SCNC: 10 MMOL/L (ref 7–16)
BASOPHILS ABSOLUTE: 0 E9/L (ref 0–0.2)
BASOPHILS RELATIVE PERCENT: 0 % (ref 0–2)
BUN BLDV-MCNC: 7 MG/DL (ref 8–23)
CALCIUM SERPL-MCNC: 8.9 MG/DL (ref 8.6–10.2)
CHLORIDE BLD-SCNC: 96 MMOL/L (ref 98–107)
CO2: 21 MMOL/L (ref 22–29)
CREAT SERPL-MCNC: 0.4 MG/DL (ref 0.5–1)
EOSINOPHILS ABSOLUTE: 2.7 E9/L (ref 0.05–0.5)
EOSINOPHILS RELATIVE PERCENT: 11.4 % (ref 0–6)
GFR AFRICAN AMERICAN: >60
GFR NON-AFRICAN AMERICAN: >60 ML/MIN/1.73
GLUCOSE BLD-MCNC: 101 MG/DL (ref 74–99)
HCT VFR BLD CALC: 29.2 % (ref 34–48)
HEMOGLOBIN: 9.2 G/DL (ref 11.5–15.5)
LYMPHOCYTES ABSOLUTE: 0.95 E9/L (ref 1.5–4)
LYMPHOCYTES RELATIVE PERCENT: 3.5 % (ref 20–42)
MCH RBC QN AUTO: 28.2 PG (ref 26–35)
MCHC RBC AUTO-ENTMCNC: 31.5 % (ref 32–34.5)
MCV RBC AUTO: 89.6 FL (ref 80–99.9)
MONOCYTES ABSOLUTE: 0.95 E9/L (ref 0.1–0.95)
MONOCYTES RELATIVE PERCENT: 3.5 % (ref 2–12)
NEUTROPHILS ABSOLUTE: 19.43 E9/L (ref 1.8–7.3)
NEUTROPHILS RELATIVE PERCENT: 81.6 % (ref 43–80)
NUCLEATED RED BLOOD CELLS: 0 /100 WBC
PDW BLD-RTO: 14.1 FL (ref 11.5–15)
PLATELET # BLD: 696 E9/L (ref 130–450)
PMV BLD AUTO: 9.1 FL (ref 7–12)
POIKILOCYTES: ABNORMAL
POTASSIUM REFLEX MAGNESIUM: 4.3 MMOL/L (ref 3.5–5)
RBC # BLD: 3.26 E12/L (ref 3.5–5.5)
ROULEAUX: ABNORMAL
SODIUM BLD-SCNC: 123 MMOL/L (ref 132–146)
SODIUM BLD-SCNC: 125 MMOL/L (ref 132–146)
SODIUM BLD-SCNC: 127 MMOL/L (ref 132–146)
TSH SERPL DL<=0.05 MIU/L-ACNC: 1.27 UIU/ML (ref 0.27–4.2)
URINE CULTURE, ROUTINE: NORMAL
WBC # BLD: 23.7 E9/L (ref 4.5–11.5)

## 2021-01-19 PROCEDURE — 6360000002 HC RX W HCPCS: Performed by: INTERNAL MEDICINE

## 2021-01-19 PROCEDURE — 1200000000 HC SEMI PRIVATE

## 2021-01-19 PROCEDURE — 85025 COMPLETE CBC W/AUTO DIFF WBC: CPT

## 2021-01-19 PROCEDURE — 84443 ASSAY THYROID STIM HORMONE: CPT

## 2021-01-19 PROCEDURE — 84295 ASSAY OF SERUM SODIUM: CPT

## 2021-01-19 PROCEDURE — 88271 CYTOGENETICS DNA PROBE: CPT

## 2021-01-19 PROCEDURE — 2580000003 HC RX 258: Performed by: INTERNAL MEDICINE

## 2021-01-19 PROCEDURE — 99232 SBSQ HOSP IP/OBS MODERATE 35: CPT | Performed by: STUDENT IN AN ORGANIZED HEALTH CARE EDUCATION/TRAINING PROGRAM

## 2021-01-19 PROCEDURE — 6370000000 HC RX 637 (ALT 250 FOR IP): Performed by: INTERNAL MEDICINE

## 2021-01-19 PROCEDURE — 81270 JAK2 GENE: CPT

## 2021-01-19 PROCEDURE — 88275 CYTOGENETICS 100-300: CPT

## 2021-01-19 PROCEDURE — 80048 BASIC METABOLIC PNL TOTAL CA: CPT

## 2021-01-19 PROCEDURE — 36415 COLL VENOUS BLD VENIPUNCTURE: CPT

## 2021-01-19 RX ORDER — FUROSEMIDE 10 MG/ML
INJECTION INTRAMUSCULAR; INTRAVENOUS
Status: DISCONTINUED
Start: 2021-01-19 | End: 2021-01-20 | Stop reason: HOSPADM

## 2021-01-19 RX ORDER — FUROSEMIDE 10 MG/ML
20 INJECTION INTRAMUSCULAR; INTRAVENOUS ONCE
Status: COMPLETED | OUTPATIENT
Start: 2021-01-19 | End: 2021-01-19

## 2021-01-19 RX ADMIN — FUROSEMIDE 20 MG: 10 INJECTION, SOLUTION INTRAMUSCULAR; INTRAVENOUS at 16:35

## 2021-01-19 RX ADMIN — CEFTRIAXONE 1 G: 1 INJECTION, POWDER, FOR SOLUTION INTRAMUSCULAR; INTRAVENOUS at 20:57

## 2021-01-19 RX ADMIN — SODIUM CHLORIDE, PRESERVATIVE FREE 10 ML: 5 INJECTION INTRAVENOUS at 21:01

## 2021-01-19 RX ADMIN — SIMETHICONE 80 MG: 80 TABLET, CHEWABLE ORAL at 21:19

## 2021-01-19 NOTE — PROGRESS NOTES
Demographics page faxed to [facility] at [facility]. Kaitlin ANAND informed that and insurance Jose Holt will be needed.

## 2021-01-19 NOTE — PROGRESS NOTES
[facility] MD Yee Jaime MD Francee Short, MD. Sunita De León MD   Progress Note    1/19/2021    SUBJECTIVE:     Stable vitals   Appear in NAD     PROBLEM LIST:    Active Problems:    Hyponatremia  Resolved Problems:    * No resolved hospital problems. *       DIET:    DIET GENERAL;       Allergies : Patient has no known allergies. Past Medical History:   Diagnosis Date    Cancer [facility])        Past Surgical History:   Procedure Laterality Date    APPENDECTOMY         History reviewed. No pertinent family history. reports that she has never smoked. She has never used smokeless tobacco. She reports previous alcohol use. She reports previous drug use. Review of Systems:   Constitutional: no fevers , no chills , feels ok   Eyes: no eye pain , no itching , no drainage  Ears, nose, mouth, throat, and face: no ear ,nose pain , hearing is ok ,no nasal drainage   Respiratory: no sob ,no cough ,no wheezing . Cardiovascular: no chest pain , no palpitation ,no sob . Gastrointestinal: no nausea, vomiting , constipation , no abdominal pain . Genitourinary:no urinary retention , no burning , dysuria . No polyuria   Hematologic/lymphatic: no bleeding , no cougulation issues . Musculoskeletal:no joint pain , no swelling . Neurological: no headaches ,no weakness , no numbness . Endocrine: no thirst , no weight issues .      MEDS (scheduled):    sodium chloride flush  10 mL Intravenous 2 times per day    enoxaparin  40 mg Subcutaneous Daily    cefTRIAXone (ROCEPHIN) IV  1 g Intravenous Q24H       MEDS (infusions):   sodium chloride 75 mL/hr at 01/18/21 1145       MEDS (prn):  simethicone, sodium chloride flush, polyethylene glycol, acetaminophen **OR** acetaminophen    PHYSICAL EXAM:     Patient Vitals for the past 24 hrs:   BP Temp Temp src Pulse Resp SpO2   01/19/21 1157 (!) 116/56 99.4 °F (37.4 °C) Oral 92 16 --   01/19/21 0801 (!) 119/57 98.3 °F (36.8 °C) Oral 84 16 96 %   01/18/21 1953 (!) 111/56 98.6 °F (37 °C) Oral 101 18 97 %   01/18/21 1629 (!) 119/56 98.3 °F (36.8 °C) Oral 100 20 --   @      Intake/Output Summary (Last 24 hours) at 1/19/2021 1320  Last data filed at 1/19/2021 3412  Gross per 24 hour   Intake --   Output 1150 ml   Net -1150 ml         Wt Readings from Last 3 Encounters:   01/17/21 136 lb (61.7 kg)   12/08/20 136 lb 2 oz (61.7 kg)       Constitutional:  in no acute distress  Oral: mucus membranes moist  Neck: no JVD  Cardiovascular: S1, S2 regular rhythm, no murmur,or rub  Respiratory:  No crackles, no wheeze  Gastrointestinal:  Soft, nontender, nondistended, NABS  Ext: no edema, feet warm  Skin: dry, no rash  Neuro: awake, alert, interactive      DATA:    Recent Labs     01/17/21  1725 01/18/21  0652 01/19/21  0620   WBC 25.6* 23.5* 23.7*   HGB 10.2* 9.6* 9.2*   HCT 30.3* 30.2* 29.2*   MCV 86.1 87.5 89.6   * 740* 696*     Recent Labs     01/17/21  1725 01/17/21  1725 01/18/21  0652 01/18/21  1348 01/18/21 2020 01/19/21  0620   *   < > 124* 127* 124* 127*   K 4.2  --  4.2 4.1  --  4.3   CL 92*  --  92* 95*  --  96*   CO2 25  --  24 19*  --  21*   BUN 13  --  11 11  --  7*   CREATININE 0.4*  --  0.4* 0.4*  --  0.4*   ALT 18  --   --   --   --   --    AST 31  --   --   --   --   --    BILITOT 0.5  --   --   --   --   --    ALKPHOS 172*  --   --   --   --   --     < > = values in this interval not displayed. No results found for: LABPROT    ASSESSMENT / RECOMMENDATIONS:      1. Hyponatremia, clinically euvolemic. 2.  Leukocytosis, followup culture, she is on Rocephin. 3.  History of cervical cancer. 4.  Urinary obstruction. She has bilateral nephrostomy tube. Plan :   Na up to 127 . Appear euvolemic . Continue NS at conservative rate .    Am labs    Electronically signed by Robert Molina MD on 1/19/2021 at 1:20 PM

## 2021-01-19 NOTE — PLAN OF CARE
1/17/2019 5:03 PM  Ivanna called and LM on therapist VM to call her due to she had to cancel her appointment today due to a family issue. Ivanna wanted to reschedule for 1/18/2019 however there were no available appointments. Therapist informed Radha Hendricks that she would call her tomorrow if anything became available. Problem: Falls - Risk of:  Goal: Will remain free from falls  Description: Will remain free from falls  Outcome: Met This Shift  Goal: Absence of physical injury  Description: Absence of physical injury  Outcome: Met This Shift

## 2021-01-19 NOTE — PROGRESS NOTES
P Quality Flow/Interdisciplinary Rounds Progress Note        Quality Flow Rounds held on January 19, 2021    Disciplines Attending:  Bedside Nurse, ,  and Nursing Unit Leadership    Jocy Rivas was admitted on 1/17/2021  4:04 PM    Anticipated Discharge Date:       Disposition:    Rafael Score:  Rafael Scale Score: 19    Readmission Risk              Risk of Unplanned Readmission:        12           Discussed patient goal for the day, patient clinical progression, and barriers to discharge.   The following Goal(s) of the Day/Commitment(s) have been identified:  Discharge [address] [place]  January 19, 2021

## 2021-01-19 NOTE — CONSULTS
EYES:  Pupils are round and reactive to light bilaterally. HEART:  Normal rate. ABDOMEN:  Soft, nontender. Bowel sounds are active. No organomegaly. LUNGS:  Good airflow bilaterally. SKIN:  Dry. PSYCHIATRIC:  Cooperative. NEUROLOGIC:  Cranial nerves grossly intact II through XII. BLOOD WORK:  Sodium 127. ASSESSMENT:  1. Hyponatremia, clinically euvolemic. 2.  Leukocytosis, followup culture, she is on Rocephin. 3.  History of cervical cancer. 4.  Urinary obstruction. She has bilateral nephrostomy tube. PLAN:  1. Continue normal saline at 75 mL/hour. Her sodium is trending up on  this support and volume depletion. 2.  I see no offending medication that could be contributing to her  hyponatremia. 3.  Her urine electrolytes were ordered and we will follow up her  results. Thank you very much.         Ramón Torres MD    D: 01/18/2021 18:11:30       T: 01/18/2021 21:34:50     [email or web address]  Job#: [id number]     Doc#: [id number]

## 2021-01-19 NOTE — PROGRESS NOTES
[facility] Progress Note    Admitting Date and Time: 1/17/2021  4:04 PM  Admit Dx: Hyponatremia [E87.1]    Subjective:  Patient is being followed for Hyponatremia [E87.1]   Pt is a Mongolian speaking lady with daughter as caretaker who speaks and understands [address] Patient has ongoing chemo and radiation at [facility] for transfer for continuity of her treatment. Per RN: Slightly lethargic. ROS: denies fever, chills, cp, sob, n/v, HA unless stated above.  sodium chloride flush  10 mL Intravenous 2 times per day    enoxaparin  40 mg Subcutaneous Daily    cefTRIAXone (ROCEPHIN) IV  1 g Intravenous Q24H         simethicone, 80 mg, Q6H PRN      sodium chloride flush, 10 mL, PRN      polyethylene glycol, 17 g, Daily PRN      acetaminophen, 650 mg, Q6H PRN    Or      acetaminophen, 650 mg, Q6H PRN         Objective:    BP (!) 119/57   Pulse 84   Temp 98.3 °F (36.8 °C) (Oral)   Resp 16   Ht 5' 2\" (1.575 m)   Wt 136 lb (61.7 kg)   SpO2 96%   BMI 24.87 kg/m²     General Appearance: alert and oriented to person, place and time and in no acute distress  Skin: warm and dry  Head: normocephalic and atraumatic  Eyes: pupils equal, round, and reactive to light, extraocular eye movements intact, conjunctivae normal  Neck: neck supple and non tender without mass   Pulmonary/Chest: clear to auscultation bilaterally- no wheezes, rales or rhonchi, normal air movement, no respiratory distress  Cardiovascular: normal rate, normal S1 and S2 and no carotid bruits  Abdomen: soft, non-tender, non-distended, normal bowel sounds, no masses or organomegaly, has b/l nephrostomy tubes draining clear urine.   Extremities: no cyanosis, no clubbing and no edema  Neurologic: no cranial nerve deficit and speech normal        Recent Labs     01/17/21  1725 01/17/21  1725 01/18/21  0652 01/18/21  1348 01/18/21 2020   *   < > 124* 127* 124*   K 4.2  --  4.2 4.1  --    CL 92*  --  92* 95*  -- CO2 25  --  24 19*  --    BUN 13  --  11 11  --    CREATININE 0.4*  --  0.4* 0.4*  --    GLUCOSE 129*  --  109* 111*  --    CALCIUM 9.2  --  9.0 8.8  --     < > = values in this interval not displayed. Recent Labs     01/17/21  1725 01/18/21  0652 01/19/21  0620   WBC 25.6* 23.5* 23.7*   RBC 3.52 3.45* 3.26*   HGB 10.2* 9.6* 9.2*   HCT 30.3* 30.2* 29.2*   MCV 86.1 87.5 89.6   MCH 29.0 27.8 28.2   MCHC 33.7 31.8* 31.5*   RDW 14.0 14.1 14.1   * 740* 696*   MPV 8.6 8.7 9.1       Micro:  No components found for: [facility])    Radiology:   Xr Chest Portable    Result Date: 1/17/2021  EXAMINATION: ONE XRAY VIEW OF THE CHEST 1/17/2021 4:21 pm COMPARISON: None. HISTORY: ORDERING SYSTEM PROVIDED HISTORY: reported fever TECHNOLOGIST PROVIDED HISTORY: Reason for exam:->reported fever FINDINGS: The lungs are without acute focal process. There is no effusion or pneumothorax. The cardiomediastinal silhouette is without acute process. The osseous structures are without acute process. No acute process. Assessment:    Active Problems:    Hyponatremia  Resolved Problems:    * No resolved hospital problems. *      Plan:  1. Hyponatremia: Slightly lethargic. IV fluids - ns @75cc/hr, trend. Glu normal, Tsh -within normal limit, urine studies consulted Nephrology.      2. Complicated UTI: Fever: Urine and blood cultures pending. Continue Rocephin for now. 3. Leukocytosis: Appears chronic . 4. Cervical cancer on chemo: receiving chemo and radiation from [facility]. Concern for ongoing continuity of her treatment. 5. Urinary obstruction status post nephrostomy tube    DVT prophylaxis: lovenox  Social -plan for transfer to [facility], patient slightly lethargic and ongoing hyponatremia. Case discussed with oncologist at [facility] -phone [phone] -awaiting bed. NOTE: This report was transcribed using voice recognition software.  Every effort was made to ensure accuracy; however, inadvertent computerized transcription errors may be present.   Electronically signed by Lan Sanz MD on 1/19/2021 at 8:35 AM

## 2021-01-19 NOTE — CARE COORDINATION
Social Work:    Advised by  Christiano Baugh that patient's daughter Nadya Pena advised her that Mrs. Thien Nur) is not eating, drinking, nor will she wear depends and is having accidents frequently. Jessica Werner family inquired about having Jahaira Hansen transferred to [facility] where she can stay and have her treatments. Social service left a voice message with patient's daughter Nadya Pena to assist her with discharge planning and advise her of skilled rehab eligibility if chemo is on hold until Jahaira Hansen regains her strength. Social service placed a call to [facility] ([phone]) and learned that Jahaira Hansen just started radiation and had approximately 4-5-treatments thus far. Jahaira Hansen is schedule to have approximately 20 more treatments. She is also receiving chemo once a week in [facility]. Await call back from family.     Electronically signed by CHANTEL Pickering on 1/19/2021 at 11:48 AM

## 2021-01-19 NOTE — CARE COORDINATION
Pt to transfer to [facility] in [place], [place]. Pre authorization obtain from [facility] at [phone] per Rosa Menjivar. Pending Auth# [id number]. Jose Rafael Rubi at [phone] with auth#.  Await call back for bed assignment.-yovana

## 2021-01-19 NOTE — PROGRESS NOTES
Spoke with patient's family (Lewis Quintanilla and daughter John smith), twice today to update. Family is upset and wants to see the patient. Explained that visitors are not allowed at this time. Family not pleased, c/o no response when call light was placed. This RN was in the patient's room many times today for VS, assessment, meds given, and lab draws. Have been in the room hourly to check the bed side heart monitor. PCAs have also been in room for VS and to empty nephro tube bags. [facility] Ariana Harrison, stated that call light was answered promptly all day and that she also went to the room to make certain that the patient was OK since she does not speak [place].

## 2021-01-19 NOTE — CONSULTS
[facility]  Hematology/Oncology  Consult      Patient Name: Julius Sandoval  YOB: 1945  PCP: No primary care provider on file. Referring Provider:      Reason for Consultation:   Chief Complaint   Patient presents with    Fever         History of Present Illness: Ms. Lo Pinzon is a pleasant 78-year-old female from [facility], who was found to have a squamous cell carcinoma of the cervix. She speaks mainly Thai and most of her history was obtained from her EMR    In brief, she was transferred to [facility] from an outside hospital due to ongoing hematuria as well as a prior diagnosis of a locally advanced cervical squamous cell carcinoma from [facility]. She had initial workup in which she had a CT abdomen and pelvis, which showed a greater than 9 cm mass in the cervix and bladder and distal vagina with no notable distant mets that she had, bilateral nephrostomy tube was placed for associated hydronephrosis. She also had approximately 15-pound weight loss. She had no significant vaginal bleeding except for when she attempts to urinate. She has had no prior radiation treatment. She is postmenopausal. She had no prior treatment in [facility]. She was seen in [place] and was determined to need concurrent chemoradiotherapy and followed by perineal template based brachytherapy boost.     She was then referred to Radiation Oncology for consultation at [facility]. Of note, she had an MRI of the pelvis on December 10, 2020, which showed a poorly defined soft tissue mass measuring 6.7 x 6.0 at the level of the cervix. She also had a PET/CT, which noted a large mass of the cervix with an SUV of 13.7, measuring 5.8 x 7.1 cm in size, appears to invade the posterior urinary bladder and extending towards the bilateral pelvic sidewalls and inferiorly into the vagina.  It was also mildly FDG-avid left obturator lymph nodes with an SUV of 3.5 and bilateral inguinal lymph nodes with mild FDG avidity with a maximum SUV of 3.2 and 2.9. There was no distant metastatic disease of note. The plan as of 1/4 is for 45 Gy in 25 fractions with 55 Gy SIB boost to gross nodes and then refer her back to [facility] for brachytherapy boost. She has had 1 dose of Cisplatin and 5 rounds of radiation. She presented to the emergency room for left sided abdominal pain and fever. She was found to have hyponatremia. Her CBC was significant for leukocytosis, mild anemia, and thrombocytosis. Consultation for leukocytosis. Diagnostic Data:     Past Medical History:   Diagnosis Date    Cancer [facility])        Patient Active Problem List    Diagnosis Date Noted    Hyponatremia 01/17/2021    Cervical mass     Complicated UTI (urinary tract infection) 12/07/2020        Past Surgical History:   Procedure Laterality Date    APPENDECTOMY         Family History  History reviewed. No pertinent family history. Social History    TOBACCO:   reports that she has never smoked. She has never used smokeless tobacco.  ETOH:   reports previous alcohol use. Home Medications  Prior to Admission medications    Medication Sig Start Date End Date Taking? Authorizing Provider   acetaminophen (TYLENOL) 325 MG tablet Take 650 mg by mouth every 6 hours as needed for Pain    Historical Provider, MD       Allergies  No Known Allergies    Review of Systems: unable to obtain unable to reach       Constitutional:  No fever chills or rigors.  Eyes: No changes in vision, discharge, or pain   ENT: No Headaches, hearing loss or vertigo. No mouth sores or sore throat. No change in taste or smell.  Cardiovascular: No chest discomfort, dyspnea on exertion, palpitations or loss of consciousness. or phlebitis.  Respiratory: Has no cough or wheezing, Has no sputum production. Has no hemoptysis, Has no pleuritic pain, .  Gastrointestinal: No abdominal pain, appetite loss, blood in stools. No change in bowel habits. No hematemesis  Genitourinary: Patient acknowledges no dysuria, trouble voiding, or hematuria. No nocturia or increased frequency.  Musculoskeletal: No gait disturbance, weakness or joint complaints.  Integumentary: No rash or pruritis.  Neurological: No headache, diplopia, change in muscle strength, numbness or tingling. No change in gait, balance, coordination, mood, affect, memory, mentation, behavior.  Psychiatric: No anxiety, or depression.  Endocrine: No temperature intolerance. No excessive thirst, fluid intake, or urination. No tremor.  Hematologic/Lymphatic: No abnormal bruising or bleeding, blood clots or swollen lymph nodes.  Allergic/Immunologic: No nasal congestion or hives. Objective  BP (!) 116/56   Pulse 92   Temp 99.4 °F (37.4 °C) (Oral)   Resp 16   Ht 5' 2\" (1.575 m)   Wt 136 lb (61.7 kg)   SpO2 96%   BMI 24.87 kg/m²     Physical Exam:   Performance Status:  General: AAO to person, place, time, in no acute distress,   Head and neck : PERRLA, EOMI . Sclera non icteric. Oropharynx : Clear  Neck: no JVD,  no adenopathy  Heart: Regular rate and regular rhythm, no murmur  Lungs: Clear to auscultation   Extremities: No edema,no cyanosis, no clubbing. Abdomen: Soft, non-tender;no masses, no organomegaly  Skin:  No rash. + neph tubes    Neurologic:Cranial nerves grossly intact. No focal motor or sensory deficits .     Recent Laboratory Data-   Lab Results   Component Value Date    WBC 23.7 (H) 01/19/2021    HGB 9.2 (L) 01/19/2021    HCT 29.2 (L) 01/19/2021    MCV 89.6 01/19/2021     (H) 01/19/2021    LYMPHOPCT 3.5 (L) 01/19/2021    RBC 3.26 (L) 01/19/2021    MCH 28.2 01/19/2021    MCHC 31.5 (L) 01/19/2021    RDW 14.1 01/19/2021    NEUTOPHILPCT 81.6 (H) 01/19/2021    MONOPCT 3.5 01/19/2021    BASOPCT 0.0 01/19/2021    NEUTROABS 19.43 (H) 01/19/2021    LYMPHSABS 0.95 (L) 01/19/2021    MONOSABS 0.95 01/19/2021    EOSABS 2.70 (H) 01/19/2021    BASOSABS 0.00 01/19/2021       Lab Results   Component Value Date     (L) 01/19/2021    K 4.3 01/19/2021    CL 96 (L) 01/19/2021    CO2 21 (L) 01/19/2021    BUN 7 (L) 01/19/2021    CREATININE 0.4 (L) 01/19/2021    GLUCOSE 101 (H) 01/19/2021    CALCIUM 8.9 01/19/2021    PROT 7.0 01/17/2021    LABALBU 3.0 (L) 01/17/2021    BILITOT 0.5 01/17/2021    ALKPHOS 172 (H) 01/17/2021    AST 31 01/17/2021    ALT 18 01/17/2021    LABGLOM >60 01/19/2021    GFRAA >60 01/19/2021       No results found for: IRON, TIBC, FERRITIN        Radiology-    Xr Chest Portable    Result Date: 1/17/2021  EXAMINATION: ONE XRAY VIEW OF THE CHEST 1/17/2021 4:21 pm COMPARISON: None. HISTORY: ORDERING SYSTEM PROVIDED HISTORY: reported fever TECHNOLOGIST PROVIDED HISTORY: Reason for exam:->reported fever FINDINGS: The lungs are without acute focal process. There is no effusion or pneumothorax. The cardiomediastinal silhouette is without acute process. The osseous structures are without acute process. No acute process. 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 Leukocytosis and thrombocytosis noted since December 2020 prior to her initiating her chemotherapy and steroids. Her differential had shown neutrophilic leukocytosis as well as eosinophilia and slight basophilia and this raises concern about possible myeloproliferative disorder with her associated thrombocytosis.   To check BCR-ABL by FISH and qualitative JAK2 mutation as well as MPL and LAWRENCE reticulin mutation to exclude myeloproliferative disorder      She will follow at [facility] for ongoing treatment of her squamous cell carcinoma of the cervix with single agent cis-platinum weekly along with concurrent radiation        NANETTE Linares - CNP  Electronically signed 1/19/2021 at 1:46 PM  Patient seen and examined.   Note edited to reflect above  Lg Ramirez MD

## 2021-01-20 PROCEDURE — 99239 HOSP IP/OBS DSCHRG MGMT >30: CPT | Performed by: STUDENT IN AN ORGANIZED HEALTH CARE EDUCATION/TRAINING PROGRAM

## 2021-01-20 NOTE — DISCHARGE INSTR - COC
Continuity of Care Form    Patient Name: [facility]   :  1945  MRN:  [id number]    [facility] date:  2021  Discharge date:  ***    Code Status Order: Full Code   Advance Directives:   Advance Care Flowsheet Documentation     Date/Time Healthcare Directive Type of Healthcare Directive Copy in [address] Agent's Name Healthcare Agent's Phone Number    21 0120  No, patient does not have an advance directive for healthcare treatment -- -- -- -- --          Admitting Physician:  Ankit Chand MD  PCP: No primary care provider on file. Discharging Nurse: [facility] Unit/Room#: 6827/1770-X  Discharging Unit Phone Number: ***    Emergency Contact:   Extended Emergency Contact Information  Primary Emergency Contact: Daysi Masters Phone: [phone]  Relation: Child    Past Surgical History:  Past Surgical History:   Procedure Laterality Date    APPENDECTOMY         Immunization History: There is no immunization history on file for this patient.     Active Problems:  Patient Active Problem List   Diagnosis Code    Complicated UTI (urinary tract infection) N39.0    Cervical mass N88.8    Hyponatremia E87.1       Isolation/Infection:   Isolation          No Isolation        Patient Infection Status     Infection Onset Added Last Indicated Last Indicated By Review Planned Expiration Resolved Resolved By    None active    Resolved    COVID-19 Rule Out 21 COVID-19 (Ordered)   21 Rule-Out Test Resulted          Nurse Assessment:  Last Vital Signs: BP (!) 107/58   Pulse 104   Temp 99 °F (37.2 °C) (Oral)   Resp 18   Ht 5' 2\" (1.575 m)   Wt 136 lb (61.7 kg)   SpO2 97%   BMI 24.87 kg/m²     Last documented pain score (0-10 scale):    Last Weight:   Wt Readings from Last 1 Encounters:   21 136 lb (61.7 kg)     Mental Status:  {IP PT MENTAL STATUS:}    IV Access:  Jennifer8 Carmen [facility] IV ACCESS:076158075}    Nursing Mobility/ADLs:  Walking   {CHP DME YMFW:116689660}  Transfer  {CHP DME RYBQ:798832084}  Bathing  {CHP DME PSKQ:441851996}  Dressing  {CHP DME AGUW:171468612}  Toileting  {CHP DME HNUM:956335050}  Feeding  {CHP DME OMCI:072365532}  Med Admin  {P DME ZMF}  Med Delivery   {[facility] MED Delivery:711406291}    Wound Care Documentation and Therapy:        Elimination:  Continence:   · Bowel: {YES / IR:86111}  · Bladder: {YES / TA:93243}  Urinary Catheter: {Urinary Catheter:306418621}   Colostomy/Ileostomy/Ileal Conduit: {YES / UK:25309}       Date of Last BM: ***    Intake/Output Summary (Last 24 hours) at 2021 2303  Last data filed at 2021 1806  Gross per 24 hour   Intake --   Output 2500 ml   Net -2500 ml     I/O last 3 completed shifts:  In: -   Out: 2500 [Urine:2500]    Safety Concerns:     [address] Safety Concerns:578288706}    Impairments/Disabilities:      [address] Impairments/Disabilities:363553461}    Nutrition Therapy:  Current Nutrition Therapy:   [address] Diet List:177279231}    Routes of Feeding: {[facility] DME Other Feedings:837040172}  Liquids: {Slp liquid thickness:19435}  Daily Fluid Restriction: {[facility] DME Yes amt example:818620216}  Last Modified Barium Swallow with Video (Video Swallowing Test): {Done Not Done FXJB:485498848}    Treatments at the Time of Hospital Discharge:   Respiratory Treatments: ***  Oxygen Therapy:  {Therapy; copd oxygen:97126}  Ventilator:    { CC Vent XNIY:159656702}    Rehab Therapies: {THERAPEUTIC INTERVENTION:8562127390}  Weight Bearing Status/Restrictions: [address] [employer] Weight Bearin}  Other Medical Equipment (for information only, NOT a DME order):  {EQUIPMENT:028867423}  Other Treatments: ***    Patient's personal belongings (please select all that are sent with patient):  {[facility] DME Belongings:760218518}    RN SIGNATURE:  {Esignature:263941160}    CASE MANAGEMENT/SOCIAL WORK SECTION    Inpatient Status Date: ***    Readmission Risk Assessment Score:  Readmission Risk              Risk of Unplanned Readmission:        12           Discharging to Facility/ Agency   · Name:   · Address:  · Phone:  · Fax:    Dialysis Facility (if applicable)   · Name:  · Address:  · Dialysis Schedule:  · Phone:  · Fax:    / signature: {Esignature:600859969}    PHYSICIAN SECTION    Prognosis: {Prognosis:2012351298}    Condition at Discharge: [address] Mcnulty Patient Condition:725129328}    Rehab Potential (if transferring to Rehab): {Prognosis:5500446751}    Recommended Labs or Other Treatments After Discharge: ***    Physician Certification: I certify the above information and transfer of Mic Mills  is necessary for the continuing treatment of the diagnosis listed and that she requires {Admit to Appropriate Level of Care:16692} for {GREATER/LESS:620505663} 30 days.      Update Admission H&P: [facility]}    PHYSICIAN SIGNATURE:  {Esignature:129133644}

## 2021-01-21 NOTE — DISCHARGE SUMMARY
[facility] Physician Discharge Summary       No follow-up provider specified. Activity level: As tolerated     Dispo: [facility]    Condition on discharge: Stable     Patient ID:  Gunner Brown  [id number]  66 y.o.  1945    Admit date: 1/17/2021    Discharge date and time:  1/20/2021  7:46 PM    Admission Diagnoses: Active Problems:    Hyponatremia  Resolved Problems:    * No resolved hospital problems. *      Discharge Diagnoses: Active Problems:    Hyponatremia  Resolved Problems:    * No resolved hospital problems. *      Consults:  IP CONSULT TO NEPHROLOGY  IP CONSULT TO ONCOLOGY    Procedures:  As per hospital course    Hospital Course:   Patient Gunner Brown is a 66 y.o. presented with Hyponatremia [E87.1]  Patient was admitted with slight lethargy and altered mental status for hyponatremia, nephrology was consulted and managed with IV fluids, she was also found to have complicated UTI with her bilateral nephrostomy tube in situ, was managed on IV Rocephin patient was subsequently transferred to [facility] for continuity of her ongoing chemo and radiation after discussion with her oncologist.     Discharge Exam:    General Appearance: alert and oriented to person, place and time and in no acute distress  Skin: warm and dry  Head: normocephalic and atraumatic  Eyes: pupils equal, round, and reactive to light, extraocular eye movements intact, conjunctivae normal  Neck: neck supple and non tender without mass   Pulmonary/Chest: clear to auscultation bilaterally- no wheezes, rales or rhonchi, normal air movement, no respiratory distress  Cardiovascular: normal rate, normal S1 and S2 and no carotid bruits  Abdomen: soft, non-tender, non-distended, normal bowel sounds, no masses or organomegaly  Extremities: no cyanosis, no clubbing and no edema  Neurologic: no cranial nerve deficit and speech normal    I/O last 3 completed shifts:  In: -   Out: 850 [Urine:850]  No intake/output data recorded. LABS:  Recent Labs     01/18/21  0652 01/18/21  1348 01/18/21  1348 01/19/21  0620 01/19/21  1200 01/19/21  2257   * 127*   < > 127* 123* 125*   K 4.2 4.1  --  4.3  --   --    CL 92* 95*  --  96*  --   --    CO2 24 19*  --  21*  --   --    BUN 11 11  --  7*  --   --    CREATININE 0.4* 0.4*  --  0.4*  --   --    GLUCOSE 109* 111*  --  101*  --   --    CALCIUM 9.0 8.8  --  8.9  --   --     < > = values in this interval not displayed. Recent Labs     01/18/21  0652 01/19/21 0620   WBC 23.5* 23.7*   RBC 3.45* 3.26*   HGB 9.6* 9.2*   HCT 30.2* 29.2*   MCV 87.5 89.6   MCH 27.8 28.2   MCHC 31.8* 31.5*   RDW 14.1 14.1   * 696*   MPV 8.7 9.1       No results for input(s): POCGLU in the last 72 hours. Imaging:  Xr Chest Portable    Result Date: 1/17/2021  EXAMINATION: ONE XRAY VIEW OF THE CHEST 1/17/2021 4:21 pm COMPARISON: None. HISTORY: ORDERING SYSTEM PROVIDED HISTORY: reported fever TECHNOLOGIST PROVIDED HISTORY: Reason for exam:->reported fever FINDINGS: The lungs are without acute focal process. There is no effusion or pneumothorax. The cardiomediastinal silhouette is without acute process. The osseous structures are without acute process. No acute process.       Patient Instructions:      Medication List      CONTINUE taking these medications    acetaminophen 325 MG tablet  Commonly known as: TYLENOL              Note that more than 30 minutes was spent in preparing discharge papers, discussing discharge with patient, medication review, etc.    Signed:  Electronically signed by Prema Vazquez MD on 1/20/2021 at 7:46 PM

## 2021-01-23 ENCOUNTER — HOSPITAL ENCOUNTER (EMERGENCY)
Age: 76
Discharge: HOME | End: 2021-01-24
Attending: EMERGENCY MEDICINE
Payer: MEDICAID

## 2021-01-23 DIAGNOSIS — R50.9 FEVER, UNSPECIFIED FEVER CAUSE: ICD-10-CM

## 2021-01-23 DIAGNOSIS — N39.0 COMPLICATED UTI (URINARY TRACT INFECTION): Primary | ICD-10-CM

## 2021-01-23 DIAGNOSIS — D06.9 SQUAMOUS CELL CARCINOMA IN SITU (SCCIS) OF CERVIX: ICD-10-CM

## 2021-01-23 LAB
ALBUMIN SERPL-MCNC: 2.7 G/DL (ref 3.5–5.2)
ALP BLD-CCNC: 163 U/L (ref 35–104)
ALT SERPL-CCNC: 15 U/L (ref 0–32)
ANION GAP SERPL CALCULATED.3IONS-SCNC: 6 MMOL/L (ref 7–16)
AST SERPL-CCNC: 19 U/L (ref 0–31)
BACTERIA: ABNORMAL /HPF
BASOPHILS ABSOLUTE: 0.07 E9/L (ref 0–0.2)
BASOPHILS RELATIVE PERCENT: 0.3 % (ref 0–2)
BILIRUB SERPL-MCNC: 0.2 MG/DL (ref 0–1.2)
BILIRUBIN URINE: NEGATIVE
BLOOD CULTURE, ROUTINE: NORMAL
BLOOD, URINE: ABNORMAL
BUN BLDV-MCNC: 15 MG/DL (ref 8–23)
CALCIUM SERPL-MCNC: 8.6 MG/DL (ref 8.6–10.2)
CHLORIDE BLD-SCNC: 96 MMOL/L (ref 98–107)
CLARITY: ABNORMAL
CO2: 24 MMOL/L (ref 22–29)
COLOR: YELLOW
CREAT SERPL-MCNC: 0.4 MG/DL (ref 0.5–1)
CULTURE, BLOOD 2: NORMAL
EOSINOPHILS ABSOLUTE: 2.42 E9/L (ref 0.05–0.5)
EOSINOPHILS RELATIVE PERCENT: 11 % (ref 0–6)
GFR AFRICAN AMERICAN: >60
GFR NON-AFRICAN AMERICAN: >60 ML/MIN/1.73
GLUCOSE BLD-MCNC: 131 MG/DL (ref 74–99)
GLUCOSE URINE: NEGATIVE MG/DL
HCT VFR BLD CALC: 27.8 % (ref 34–48)
HEMOGLOBIN: 9 G/DL (ref 11.5–15.5)
IMMATURE GRANULOCYTES #: 0.16 E9/L
IMMATURE GRANULOCYTES %: 0.7 % (ref 0–5)
KETONES, URINE: NEGATIVE MG/DL
LEUKOCYTE ESTERASE, URINE: ABNORMAL
LYMPHOCYTES ABSOLUTE: 0.99 E9/L (ref 1.5–4)
LYMPHOCYTES RELATIVE PERCENT: 4.5 % (ref 20–42)
MCH RBC QN AUTO: 28.2 PG (ref 26–35)
MCHC RBC AUTO-ENTMCNC: 32.4 % (ref 32–34.5)
MCV RBC AUTO: 87.1 FL (ref 80–99.9)
MONOCYTES ABSOLUTE: 1.48 E9/L (ref 0.1–0.95)
MONOCYTES RELATIVE PERCENT: 6.7 % (ref 2–12)
NEUTROPHILS ABSOLUTE: 16.92 E9/L (ref 1.8–7.3)
NEUTROPHILS RELATIVE PERCENT: 76.8 % (ref 43–80)
NITRITE, URINE: POSITIVE
PDW BLD-RTO: 14.5 FL (ref 11.5–15)
PH UA: 6 (ref 5–9)
PLATELET # BLD: 621 E9/L (ref 130–450)
PMV BLD AUTO: 8.9 FL (ref 7–12)
POTASSIUM SERPL-SCNC: 4 MMOL/L (ref 3.5–5)
PROTEIN UA: ABNORMAL MG/DL
RBC # BLD: 3.19 E12/L (ref 3.5–5.5)
RBC # BLD: NORMAL 10*6/UL
RBC UA: ABNORMAL /HPF (ref 0–2)
SODIUM BLD-SCNC: 126 MMOL/L (ref 132–146)
SPECIFIC GRAVITY UA: 1.02 (ref 1–1.03)
TOTAL PROTEIN: 6.2 G/DL (ref 6.4–8.3)
UROBILINOGEN, URINE: 0.2 E.U./DL
WBC # BLD: 22 E9/L (ref 4.5–11.5)
WBC UA: ABNORMAL /HPF (ref 0–5)

## 2021-01-23 PROCEDURE — 96368 THER/DIAG CONCURRENT INF: CPT

## 2021-01-23 PROCEDURE — 6360000002 HC RX W HCPCS: Performed by: EMERGENCY MEDICINE

## 2021-01-23 PROCEDURE — 96366 THER/PROPH/DIAG IV INF ADDON: CPT

## 2021-01-23 PROCEDURE — U0002 COVID-19 LAB TEST NON-CDC: HCPCS

## 2021-01-23 PROCEDURE — 99285 EMERGENCY DEPT VISIT HI MDM: CPT

## 2021-01-23 PROCEDURE — 80053 COMPREHEN METABOLIC PANEL: CPT

## 2021-01-23 PROCEDURE — 87040 BLOOD CULTURE FOR BACTERIA: CPT

## 2021-01-23 PROCEDURE — 96365 THER/PROPH/DIAG IV INF INIT: CPT

## 2021-01-23 PROCEDURE — 81001 URINALYSIS AUTO W/SCOPE: CPT

## 2021-01-23 PROCEDURE — 85025 COMPLETE CBC W/AUTO DIFF WBC: CPT

## 2021-01-23 PROCEDURE — 2580000003 HC RX 258: Performed by: EMERGENCY MEDICINE

## 2021-01-23 RX ORDER — LEVOFLOXACIN 5 MG/ML
750 INJECTION, SOLUTION INTRAVENOUS EVERY 24 HOURS
Status: DISCONTINUED | OUTPATIENT
Start: 2021-01-23 | End: 2021-01-24 | Stop reason: HOSPADM

## 2021-01-23 RX ADMIN — CEFTRIAXONE 2000 MG: 2 INJECTION, POWDER, FOR SOLUTION INTRAMUSCULAR; INTRAVENOUS at 23:01

## 2021-01-23 ASSESSMENT — ENCOUNTER SYMPTOMS
COUGH: 0
EYE REDNESS: 0
SHORTNESS OF BREATH: 0
EYE DISCHARGE: 0
DIARRHEA: 0
SORE THROAT: 0
ABDOMINAL DISTENTION: 0
WHEEZING: 0
VOMITING: 0
EYE PAIN: 0
NAUSEA: 0
BACK PAIN: 0
SINUS PRESSURE: 0

## 2021-01-24 VITALS
RESPIRATION RATE: 14 BRPM | TEMPERATURE: 99.2 F | OXYGEN SATURATION: 96 % | HEART RATE: 100 BPM | WEIGHT: 136 LBS | DIASTOLIC BLOOD PRESSURE: 53 MMHG | SYSTOLIC BLOOD PRESSURE: 117 MMHG | BODY MASS INDEX: 24.87 KG/M2

## 2021-01-24 LAB — SARS-COV-2, NAAT: NOT DETECTED

## 2021-01-24 PROCEDURE — 6360000002 HC RX W HCPCS: Performed by: EMERGENCY MEDICINE

## 2021-01-24 PROCEDURE — 2580000003 HC RX 258: Performed by: STUDENT IN AN ORGANIZED HEALTH CARE EDUCATION/TRAINING PROGRAM

## 2021-01-24 PROCEDURE — 6370000000 HC RX 637 (ALT 250 FOR IP)

## 2021-01-24 PROCEDURE — 2580000003 HC RX 258: Performed by: EMERGENCY MEDICINE

## 2021-01-24 RX ORDER — ACETAMINOPHEN 325 MG/1
TABLET ORAL
Status: COMPLETED
Start: 2021-01-24 | End: 2021-01-24

## 2021-01-24 RX ORDER — ACETAMINOPHEN 325 MG/1
650 TABLET ORAL ONCE
Status: COMPLETED | OUTPATIENT
Start: 2021-01-24 | End: 2021-01-24

## 2021-01-24 RX ORDER — SULFAMETHOXAZOLE AND TRIMETHOPRIM 800; 160 MG/1; MG/1
1 TABLET ORAL 2 TIMES DAILY
Qty: 14 TABLET | Refills: 0 | Status: SHIPPED | OUTPATIENT
Start: 2021-01-24 | End: 2021-01-31

## 2021-01-24 RX ORDER — 0.9 % SODIUM CHLORIDE 0.9 %
1000 INTRAVENOUS SOLUTION INTRAVENOUS ONCE
Status: COMPLETED | OUTPATIENT
Start: 2021-01-24 | End: 2021-01-24

## 2021-01-24 RX ORDER — LEVOFLOXACIN 500 MG/1
500 TABLET, FILM COATED ORAL DAILY
Qty: 7 TABLET | Refills: 0 | Status: SHIPPED | OUTPATIENT
Start: 2021-01-24 | End: 2021-01-31

## 2021-01-24 RX ADMIN — VANCOMYCIN HYDROCHLORIDE 1250 MG: 10 INJECTION, POWDER, LYOPHILIZED, FOR SOLUTION INTRAVENOUS at 02:53

## 2021-01-24 RX ADMIN — SODIUM CHLORIDE 1000 ML: 9 INJECTION, SOLUTION INTRAVENOUS at 05:26

## 2021-01-24 RX ADMIN — ACETAMINOPHEN 650 MG: 325 TABLET ORAL at 10:24

## 2021-01-24 RX ADMIN — LEVOFLOXACIN 750 MG: 5 INJECTION, SOLUTION INTRAVENOUS at 00:38

## 2021-01-24 ASSESSMENT — PAIN SCALES - GENERAL: PAINLEVEL_OUTOF10: 2

## 2021-01-24 NOTE — ED NOTES
Pt does not speak any english.  used several times to converse with patient. To keep her informed and updated as to what is going on in her plan of care.       Geoff Herman RN  01/24/21 2486

## 2021-01-24 NOTE — ED NOTES
Britany Duran from [facility] called in to inquire what last set of vitals were on patient and how she did over the night, nurse provided information.      Denise Hernandez RN  01/24/21 3204

## 2021-01-24 NOTE — ED NOTES
Alvaro Henriquez stated he reached daughter and she will be here in 15 minutes, patient up into wheel chair for discharge at this time.      Dalton Ortiz RN  01/24/21 2404

## 2021-01-24 NOTE — ED NOTES
Called grand daughter Merrick Bruno on file and spoke with her regarding coming to pick patient up, she advised me that she will try and reach her mom Brent Gilliam and let her know. This nurse did advises Merrick Olsonjudy of the discharge plans and dr recommendation for patient to go home on oral antibiotics.      Cosmo Sánchez RN  01/24/21 1622

## 2021-01-24 NOTE — PROGRESS NOTES
[facility] called @ [phone], Dr. Rajni Huffman spoke with Dr. Jh Zavaleta. Per Dr. Rajni Huffman, Dr. Jh Zavaleta states the patient doesn't need to be transferred.

## 2021-01-24 NOTE — ED NOTES
Nephrostomy bag emptied at this time for 400ml total, 200ml each side.      Triny Patel RN  01/24/21 1424

## 2021-01-24 NOTE — ED NOTES
Bed: 26  Expected date:   Expected time:   Means of arrival:   Comments:  1515 Jalen Puga RN  01/23/21 2021

## 2021-01-24 NOTE — ED NOTES
This nurse called and left message on daughters phone with ER # and my name for return call.      Jennifer Mann RN  01/24/21 7247

## 2021-01-24 NOTE — ED NOTES
This nurse attempted to reach grand daughter Doris Almanzar to find out if she reached her mom to get patient taken home, no answer, vm left with reason for call and ER# to call back.      Shalonda David RN  01/24/21 2731

## 2021-01-24 NOTE — ED NOTES
Pts granddaughter called in wanting an update and stated \"we would like to take pt home and get her antibiotic filled and if needed take her to [facility]. \" Had Dr. Gracie Gómez speak to family in regards to leaving and he states \"family was agreeable to her staying and if they feel that they would like to take her home they/the patient would have to sign out AMA. \" Family is agreeable for her to stay at this time.       Bryson Leung RN  01/24/21 1869

## 2021-01-24 NOTE — ED NOTES
Patient daughter [facility] and grand daughter called in and requested patient be in waiting room waiting cause they are 5 minutes away, discharge instructions reviewed with them telephonically and verbalizes understanding, patient placed in waiting room in wheelchair as requested, Vivi Hernandez RN advised at pivot advised.      Michael Shepard RN  01/24/21 8201

## 2021-01-24 NOTE — ED NOTES
This nurse attempted to reach patient's daughter Payton Ellison again, vm left with call back # an reason for call.      Dalton Ortiz RN  01/24/21 3738

## 2021-01-24 NOTE — ED NOTES
Breakfast tray ordered after getting okay from Dr. Jennifer Apodaca at this time.      Felix Meyers RN  01/24/21 7238

## 2021-01-24 NOTE — ED NOTES
9:08 AM EST    I received this patient at sign out from Dr. Fatou Stapleton    I have discussed the patient's initial exam, treatment and plan of care with the out going physician. I have introduced my self to the patient / family and have answered their questions to this point. I have examined the patient myself and reviewed ordered tests / medications and reviewed any available results to this point. If a resident is involved in the Emergency Department care, I have discussed my findings and plan with them as well. Patient is awaiting transfer to [address] [place], [place] [address] [place] for UTI. Status post nephrostomy tubes patient has stage IV bladder CA. Was recently discharged from that facility did not take her antibiotics for her UTI. She was seen yesterday by a colleague here in the emergency department at [facility]. She was given IV Rocephin Levaquin and vancomycin. I did speak with her PCP at [facility] Dr. Eileen Mckeon and reviewed the case. He felt the patient after getting IV antibiotics was okay to be discharged on oral antibiotics. I did speak with the patient regarding this using the  she states she is feeling better did eat some breakfast she has some abdominal gas no fevers chills nausea vomiting or diarrhea reported. I did review all her labs and ED diagnostics including CT. Patient be discharged home on Levaquin and Bactrim for 7 days and she was encouraged to follow-up with Dr. Eileen Mckeon next week.      Joelle Carreon,   01/24/21 3088

## 2021-01-24 NOTE — ED NOTES
Dr Nel Livingston advised of current vitals, new orders received.      Michael Shepard RN  01/24/21 6163

## 2021-01-24 NOTE — PROGRESS NOTES
2051 Called access center to reach out to Dr. Harjeet Godoy at [facility] for Dr. Adali Dodge  2117 Dr. Yaritza Barakat called and spoke with Dr. Adali Dodge at this time.   2211 Called access center to reach out to Dr. Venus Benson for Dr. Adali Dodge  2335 Dr. Lex Lau has accepted the patient to [address] [place] [address] to see if [facility] had a bed assignment, No available beds at this time discharge dependent

## 2021-01-27 LAB
Lab: NORMAL
REPORT: NORMAL
THIS TEST SENT TO: NORMAL

## 2021-01-29 LAB
BLOOD CULTURE, ROUTINE: NORMAL
CULTURE, BLOOD 2: NORMAL
Lab: NORMAL
REPORT: NORMAL
THIS TEST SENT TO: NORMAL

## 2021-04-05 ENCOUNTER — APPOINTMENT (OUTPATIENT)
Dept: CT IMAGING | Age: 76
End: 2021-04-05
Payer: MEDICAID

## 2021-04-05 ENCOUNTER — HOSPITAL ENCOUNTER (EMERGENCY)
Age: 76
Discharge: ANOTHER HOSPITAL | End: 2021-04-06
Attending: EMERGENCY MEDICINE
Payer: MEDICAID

## 2021-04-05 DIAGNOSIS — Z43.6 ATTENTION TO NEPHROSTOMY (HCC): ICD-10-CM

## 2021-04-05 DIAGNOSIS — N39.0 COMPLICATED UTI (URINARY TRACT INFECTION): Primary | ICD-10-CM

## 2021-04-05 LAB
ALBUMIN SERPL-MCNC: 3.5 G/DL (ref 3.5–5.2)
ALP BLD-CCNC: 174 U/L (ref 35–104)
ALT SERPL-CCNC: 6 U/L (ref 0–32)
AMORPHOUS: PRESENT
ANION GAP SERPL CALCULATED.3IONS-SCNC: 8 MMOL/L (ref 7–16)
AST SERPL-CCNC: 14 U/L (ref 0–31)
BACTERIA: ABNORMAL /HPF
BACTERIA: ABNORMAL /HPF
BASOPHILS ABSOLUTE: 0.03 E9/L (ref 0–0.2)
BASOPHILS RELATIVE PERCENT: 0.4 % (ref 0–2)
BILIRUB SERPL-MCNC: <0.2 MG/DL (ref 0–1.2)
BILIRUBIN DIRECT: <0.2 MG/DL (ref 0–0.3)
BILIRUBIN URINE: NEGATIVE
BILIRUBIN URINE: NEGATIVE
BILIRUBIN, INDIRECT: ABNORMAL MG/DL (ref 0–1)
BLOOD, URINE: ABNORMAL
BLOOD, URINE: ABNORMAL
BUN BLDV-MCNC: 12 MG/DL (ref 8–23)
CALCIUM SERPL-MCNC: 9.6 MG/DL (ref 8.6–10.2)
CHLORIDE BLD-SCNC: 100 MMOL/L (ref 98–107)
CLARITY: ABNORMAL
CLARITY: ABNORMAL
CO2: 27 MMOL/L (ref 22–29)
COLOR: ABNORMAL
COLOR: ABNORMAL
CREAT SERPL-MCNC: 0.5 MG/DL (ref 0.5–1)
EOSINOPHILS ABSOLUTE: 0.35 E9/L (ref 0.05–0.5)
EOSINOPHILS RELATIVE PERCENT: 4.7 % (ref 0–6)
GFR AFRICAN AMERICAN: >60
GFR NON-AFRICAN AMERICAN: >60 ML/MIN/1.73
GLUCOSE BLD-MCNC: 123 MG/DL (ref 74–99)
GLUCOSE URINE: NEGATIVE MG/DL
GLUCOSE URINE: NEGATIVE MG/DL
HCT VFR BLD CALC: 32.8 % (ref 34–48)
HEMOGLOBIN: 10.7 G/DL (ref 11.5–15.5)
IMMATURE GRANULOCYTES #: 0.02 E9/L
IMMATURE GRANULOCYTES %: 0.3 % (ref 0–5)
KETONES, URINE: NEGATIVE MG/DL
KETONES, URINE: NEGATIVE MG/DL
LEUKOCYTE ESTERASE, URINE: ABNORMAL
LEUKOCYTE ESTERASE, URINE: ABNORMAL
LYMPHOCYTES ABSOLUTE: 1.41 E9/L (ref 1.5–4)
LYMPHOCYTES RELATIVE PERCENT: 19 % (ref 20–42)
MCH RBC QN AUTO: 31.3 PG (ref 26–35)
MCHC RBC AUTO-ENTMCNC: 32.6 % (ref 32–34.5)
MCV RBC AUTO: 95.9 FL (ref 80–99.9)
MONOCYTES ABSOLUTE: 1.27 E9/L (ref 0.1–0.95)
MONOCYTES RELATIVE PERCENT: 17.1 % (ref 2–12)
NEUTROPHILS ABSOLUTE: 4.33 E9/L (ref 1.8–7.3)
NEUTROPHILS RELATIVE PERCENT: 58.5 % (ref 43–80)
NITRITE, URINE: NEGATIVE
NITRITE, URINE: NEGATIVE
PDW BLD-RTO: 17.5 FL (ref 11.5–15)
PH UA: 6 (ref 5–9)
PH UA: 6 (ref 5–9)
PLATELET # BLD: 421 E9/L (ref 130–450)
PMV BLD AUTO: 8.8 FL (ref 7–12)
POTASSIUM SERPL-SCNC: 3.7 MMOL/L (ref 3.5–5)
PROTEIN UA: 100 MG/DL
PROTEIN UA: 100 MG/DL
RBC # BLD: 3.42 E12/L (ref 3.5–5.5)
RBC UA: ABNORMAL /HPF (ref 0–2)
RBC UA: ABNORMAL /HPF (ref 0–2)
SODIUM BLD-SCNC: 135 MMOL/L (ref 132–146)
SPECIFIC GRAVITY UA: >=1.03 (ref 1–1.03)
SPECIFIC GRAVITY UA: >=1.03 (ref 1–1.03)
TOTAL PROTEIN: 7.2 G/DL (ref 6.4–8.3)
UROBILINOGEN, URINE: 0.2 E.U./DL
UROBILINOGEN, URINE: 0.2 E.U./DL
WBC # BLD: 7.4 E9/L (ref 4.5–11.5)
WBC UA: >20 /HPF (ref 0–5)
WBC UA: >20 /HPF (ref 0–5)

## 2021-04-05 PROCEDURE — 81001 URINALYSIS AUTO W/SCOPE: CPT

## 2021-04-05 PROCEDURE — 96365 THER/PROPH/DIAG IV INF INIT: CPT

## 2021-04-05 PROCEDURE — 96366 THER/PROPH/DIAG IV INF ADDON: CPT

## 2021-04-05 PROCEDURE — 74176 CT ABD & PELVIS W/O CONTRAST: CPT

## 2021-04-05 PROCEDURE — 99285 EMERGENCY DEPT VISIT HI MDM: CPT

## 2021-04-05 PROCEDURE — 96375 TX/PRO/DX INJ NEW DRUG ADDON: CPT

## 2021-04-05 PROCEDURE — 96368 THER/DIAG CONCURRENT INF: CPT

## 2021-04-05 PROCEDURE — 85025 COMPLETE CBC W/AUTO DIFF WBC: CPT

## 2021-04-05 PROCEDURE — 80048 BASIC METABOLIC PNL TOTAL CA: CPT

## 2021-04-05 PROCEDURE — 80076 HEPATIC FUNCTION PANEL: CPT

## 2021-04-05 RX ORDER — SODIUM CHLORIDE 0.9 % (FLUSH) 0.9 %
10 SYRINGE (ML) INJECTION PRN
Status: DISCONTINUED | OUTPATIENT
Start: 2021-04-05 | End: 2021-04-06 | Stop reason: HOSPADM

## 2021-04-05 ASSESSMENT — ENCOUNTER SYMPTOMS
EYE DISCHARGE: 0
COUGH: 0
SHORTNESS OF BREATH: 0
ABDOMINAL DISTENTION: 0
SINUS PRESSURE: 0
VOMITING: 0
EYE REDNESS: 0
WHEEZING: 0
DIARRHEA: 0
SORE THROAT: 0
EYE PAIN: 0
BACK PAIN: 0
NAUSEA: 0

## 2021-04-05 NOTE — ED NOTES
Patient is a poor historian on reason for presenting to the ER. Patient states her daughter knows everything and will be coming in to the ER. Per EMS report, family is concerned that bilateral tubes in patients back are \"clogged\" and \"smell\" and needs transferred to [facility]. Patient stated that she came in for blood pressure.      Sushila Miller RN  04/05/21 9759

## 2021-04-05 NOTE — ED NOTES
Bed: 27  Expected date:   Expected time:   Means of arrival:   Comments:  ems     Sukh Mckeon RN  04/05/21 1200

## 2021-04-05 NOTE — ED PROVIDER NOTES
75-year-old female presents to the emergency department with concerns that her nephrostomy tubes are clogged and according to EMS the patient is to be transferred to [facility]. Patient has been seen in the past for issues with urinary tract infections and has followed with urologist at [facility] in [place]. She states that she thought there was pus in her urine and this is what prompted her to come in to be evaluated according to her home health nurse. All history which was difficult to gather it was in gathered through  services. Patient appears to have some communication difficulties and some dementia making it more difficult to grab with her history. The history is provided by the EMS personnel and the patient. The history is limited by the condition of the patient and a language barrier. A  was used. Review of Systems   Constitutional: Negative for chills and fever. HENT: Negative for ear pain, sinus pressure and sore throat. Eyes: Negative for pain, discharge and redness. Respiratory: Negative for cough, shortness of breath and wheezing. Cardiovascular: Negative for chest pain. Gastrointestinal: Negative for abdominal distention, diarrhea, nausea and vomiting. Genitourinary: Negative for dysuria and frequency. Musculoskeletal: Negative for arthralgias and back pain. Skin: Negative for rash and wound. Neurological: Negative for weakness and headaches. Hematological: Negative for adenopathy. All other systems reviewed and are negative. Physical Exam  Constitutional:       Appearance: Normal appearance. HENT:      Head: Normocephalic and atraumatic. Nose: Nose normal.      Mouth/Throat:      Mouth: Mucous membranes are moist.   Eyes:      Extraocular Movements: Extraocular movements intact. Pupils: Pupils are equal, round, and reactive to light. Cardiovascular:      Rate and Rhythm: Normal rate and regular rhythm. Pulses: Normal pulses. Heart sounds: Normal heart sounds. Pulmonary:      Effort: Pulmonary effort is normal.      Breath sounds: Normal breath sounds. Abdominal:      General: Abdomen is flat. Bowel sounds are normal.      Palpations: Abdomen is soft. Comments: Bilateral nephrostomy tubes in place both appear to have a clear yellow urine no evidence of any purulence in urine or in tubes. Musculoskeletal: Normal range of motion. Skin:     General: Skin is warm. Capillary Refill: Capillary refill takes less than 2 seconds. Neurological:      General: No focal deficit present. Mental Status: She is alert and oriented to person, place, and time. Procedures     MDM  Number of Diagnoses or Management Options  Diagnosis management comments: Patient seen and examined. Labs and imaging were ordered. I did draw a urinalysis from both nephrostomy tubes. Both show greater than 20 white blood cells which does not give me the idea of there is a possibility of urinary tract infection. I did discuss these results with the daughter they would like the patient if needed to be transferred to go to [facility]. I did speak with right the urogynecology fellow who accepted the patient for admission for complicated urinary tract infection I did recommend antibiotics be ordered. Patient and daughter updated on findings and plan of transfer. ED Course as of Apr 06 0046   Mon Apr 05, 2021   6942 Spoke with Dr. Coralyn Canavan at [facility] urogynecology who was agreeable to admit the patient for further evaluations. Blood cultures and urine cultures were sent from nephrostomy tubes. Antibiotics were initiated. Patient's daughter Jackqueline Hammans was updated. [CF]      ED Course User Index  [CF] Alex Russo DO        -------------------------------------------- PAST HISTORY ---------------------------------------------  Past Medical History:  has a past medical history of Cancer ([facility] [address]     Past Surgical History:  has a past surgical history that includes Appendectomy. Social History:  reports that she has never smoked. She has never used smokeless tobacco. She reports previous alcohol use. She reports previous drug use. Family History: family history is not on file. The patients home medications have been reviewed. Allergies: Patient has no known allergies.     -------------------------------------------------- RESULTS -------------------------------------------------    Lab  Results for orders placed or performed during the hospital encounter of 04/05/21   CBC Auto Differential   Result Value Ref Range    WBC 7.4 4.5 - 11.5 E9/L    RBC 3.42 (L) 3.50 - 5.50 E12/L    Hemoglobin 10.7 (L) 11.5 - 15.5 g/dL    Hematocrit 32.8 (L) 34.0 - 48.0 %    MCV 95.9 80.0 - 99.9 fL    MCH 31.3 26.0 - 35.0 pg    MCHC 32.6 32.0 - 34.5 %    RDW 17.5 (H) 11.5 - 15.0 fL    Platelets 347 279 - 022 E9/L    MPV 8.8 7.0 - 12.0 fL    Neutrophils % 58.5 43.0 - 80.0 %    Immature Granulocytes % 0.3 0.0 - 5.0 %    Lymphocytes % 19.0 (L) 20.0 - 42.0 %    Monocytes % 17.1 (H) 2.0 - 12.0 %    Eosinophils % 4.7 0.0 - 6.0 %    Basophils % 0.4 0.0 - 2.0 %    Neutrophils Absolute 4.33 1.80 - 7.30 E9/L    Immature Granulocytes # 0.02 E9/L    Lymphocytes Absolute 1.41 (L) 1.50 - 4.00 E9/L    Monocytes Absolute 1.27 (H) 0.10 - 0.95 E9/L    Eosinophils Absolute 0.35 0.05 - 0.50 E9/L    Basophils Absolute 0.03 0.00 - 0.20 G5/U   Basic Metabolic Panel   Result Value Ref Range    Sodium 135 132 - 146 mmol/L    Potassium 3.7 3.5 - 5.0 mmol/L    Chloride 100 98 - 107 mmol/L    CO2 27 22 - 29 mmol/L    Anion Gap 8 7 - 16 mmol/L    Glucose 123 (H) 74 - 99 mg/dL    BUN 12 8 - 23 mg/dL    CREATININE 0.5 0.5 - 1.0 mg/dL    GFR Non-African American >60 >=60 mL/min/1.73    GFR African American >60     Calcium 9.6 8.6 - 10.2 mg/dL   Hepatic Function Panel   Result Value Ref Range    Total Protein 7.2 6.4 - 8.3 g/dL    Albumin 3.5 3.5 - 5.2 g/dL Alkaline Phosphatase 174 (H) 35 - 104 U/L    ALT 6 0 - 32 U/L    AST 14 0 - 31 U/L    Total Bilirubin <0.2 0.0 - 1.2 mg/dL    Bilirubin, Direct <0.2 0.0 - 0.3 mg/dL    Bilirubin, Indirect see below 0.0 - 1.0 mg/dL   Urinalysis   Result Value Ref Range    Color, UA DARK YELLOW (A) Straw/Yellow    Clarity, UA TURBID (A) Clear    Glucose, Ur Negative Negative mg/dL    Bilirubin Urine Negative Negative    Ketones, Urine Negative Negative mg/dL    Specific Gravity, UA >=1.030 1.005 - 1.030    Blood, Urine SMALL (A) Negative    pH, UA 6.0 5.0 - 9.0    Protein,  (A) Negative mg/dL    Urobilinogen, Urine 0.2 <2.0 E.U./dL    Nitrite, Urine Negative Negative    Leukocyte Esterase, Urine MODERATE (A) Negative   Urinalysis   Result Value Ref Range    Color, UA DARK YELLOW (A) Straw/Yellow    Clarity, UA CLOUDY (A) Clear    Glucose, Ur Negative Negative mg/dL    Bilirubin Urine Negative Negative    Ketones, Urine Negative Negative mg/dL    Specific Gravity, UA >=1.030 1.005 - 1.030    Blood, Urine MODERATE (A) Negative    pH, UA 6.0 5.0 - 9.0    Protein,  (A) Negative mg/dL    Urobilinogen, Urine 0.2 <2.0 E.U./dL    Nitrite, Urine Negative Negative    Leukocyte Esterase, Urine MODERATE (A) Negative   Microscopic Urinalysis   Result Value Ref Range    WBC, UA >20 (A) 0 - 5 /HPF    RBC, UA 5-10 (A) 0 - 2 /HPF    Bacteria, UA MANY (A) None Seen /HPF    Amorphous, UA PRESENT    Microscopic Urinalysis   Result Value Ref Range    WBC, UA >20 (A) 0 - 5 /HPF    RBC, UA 5-10 (A) 0 - 2 /HPF    Bacteria, UA MANY (A) None Seen /HPF       Radiology  Ct Abdomen Pelvis Wo Contrast Additional Contrast? None    Result Date: 4/5/2021  EXAMINATION: CT OF THE ABDOMEN AND PELVIS WITHOUT CONTRAST 4/5/2021 10:27 pm TECHNIQUE: CT of the abdomen and pelvis was performed without the administration of intravenous contrast. Multiplanar reformatted images are provided for review.  Dose modulation, iterative reconstruction, and/or weight based adjustment of the mA/kV was utilized to reduce the radiation dose to as low as reasonably achievable. COMPARISON: None 12/07/2020 HISTORY: ORDERING SYSTEM PROVIDED HISTORY: eval for nephrostomy tube placement TECHNOLOGIST PROVIDED HISTORY: Reason for exam:->eval for nephrostomy tube placement Additional Contrast?->None Decision Support Exception->Emergency Medical Condition (MA) FINDINGS: Lower Chest: The lung bases are unremarkable. Organs: The liver, spleen, adrenal glands, pancreas and gallbladder are unremarkable. Bilateral percutaneous nephrostomy tubes identified with the tips within the bilateral renal pelvises. No hydronephrosis. No evidence for hydroureter. GI/Bowel: Diffuse colonic fecal retention. The small bowel is unremarkable. Pelvis: No free air or free fluid. Peritoneum/Retroperitoneum: No free air or free fluid. Bones/Soft Tissues:   Grossly normal soft tissues and osseous structures. Degenerative changes involving both hip joints. Bilateral percutaneous nephrostomy tube tips within the renal pelvis sees. No hydronephrosis. Diffuse colonic fecal retention. Diffuse urinary bladder wall thickening. Correlate with cystitis clinically. ------------------------- NURSING NOTES AND VITALS REVIEWED ---------------------------  Date / Time Roomed:  4/5/2021  6:36 PM  ED Bed Assignment:  27/27    The nursing notes within the ED encounter and vital signs as below have been reviewed.    Patient Vitals for the past 24 hrs:   BP Temp Pulse Resp SpO2 Height Weight   04/06/21 0038 124/80 -- 90 17 99 % -- --   04/05/21 2330 (!) 134/55 -- 92 18 98 % -- --   04/05/21 2215 (!) 114/55 -- 90 -- 98 % -- --   04/05/21 2146 124/63 -- 94 16 97 % -- --   04/05/21 2100 (!) 121/56 -- 91 -- 97 % -- --   04/05/21 2030 (!) 120/49 -- 85 -- 99 % -- --   04/05/21 2015 116/71 -- 87 -- 98 % -- --   04/05/21 1846 135/65 98.5 °F (36.9 °C) 102 16 100 % 5' 2\" (1.575 m) 136 lb (61.7 kg)       Oxygen Saturation Interpretation: Normal      ------------------------------------------ PROGRESS NOTES ------------------------------------------    I have spoken with the patient and discussed todays results, in addition to providing specific details for the plan of care and counseling regarding the diagnosis and prognosis. Their questions are answered at this time and they are agreeable with the plan. I have discussed the risks and benefits of transfer and they wish to proceed with the transfer. --------------------------------- ADDITIONAL PROVIDER NOTES ---------------------------------  Reason for transfer: [facility].    This patient's ED course included: a personal history and physicial examination, re-evaluation prior to disposition, multiple bedside re-evaluations, IV medications, cardiac monitoring, continuous pulse oximetry and complex medical decision making and emergency management    This patient has remained hemodynamically stable during their ED course. Please note that the withdrawal or failure to initiate urgent interventions for this patient would likely result in a life threatening deterioration or permanent disability. Accordingly this patient received 30 minutes of critical care time, excluding separately billable procedures. Clinical Impression  1. Complicated UTI (urinary tract infection)    2. Attention to nephrostomy [facility]          Disposition  Patient's disposition: Transfer to [facility]''' . Transferred by: [place]. Patient's condition is stable.        Sai Vallecillo DO  04/06/21 0768

## 2021-04-06 VITALS
BODY MASS INDEX: 25.03 KG/M2 | DIASTOLIC BLOOD PRESSURE: 54 MMHG | TEMPERATURE: 98.7 F | OXYGEN SATURATION: 98 % | RESPIRATION RATE: 16 BRPM | SYSTOLIC BLOOD PRESSURE: 116 MMHG | HEART RATE: 90 BPM | WEIGHT: 136 LBS | HEIGHT: 62 IN

## 2021-04-06 LAB — SARS-COV-2, NAAT: NOT DETECTED

## 2021-04-06 PROCEDURE — 6360000002 HC RX W HCPCS: Performed by: EMERGENCY MEDICINE

## 2021-04-06 PROCEDURE — 2500000003 HC RX 250 WO HCPCS: Performed by: EMERGENCY MEDICINE

## 2021-04-06 PROCEDURE — 87088 URINE BACTERIA CULTURE: CPT

## 2021-04-06 PROCEDURE — 96375 TX/PRO/DX INJ NEW DRUG ADDON: CPT

## 2021-04-06 PROCEDURE — 87040 BLOOD CULTURE FOR BACTERIA: CPT

## 2021-04-06 PROCEDURE — 96365 THER/PROPH/DIAG IV INF INIT: CPT

## 2021-04-06 PROCEDURE — 87635 SARS-COV-2 COVID-19 AMP PRB: CPT

## 2021-04-06 PROCEDURE — 96368 THER/DIAG CONCURRENT INF: CPT

## 2021-04-06 PROCEDURE — 2580000003 HC RX 258: Performed by: EMERGENCY MEDICINE

## 2021-04-06 PROCEDURE — 96366 THER/PROPH/DIAG IV INF ADDON: CPT

## 2021-04-06 RX ADMIN — FAMOTIDINE 20 MG: 10 INJECTION, SOLUTION INTRAVENOUS at 03:01

## 2021-04-06 RX ADMIN — VANCOMYCIN HYDROCHLORIDE 1000 MG: 1 INJECTION, POWDER, LYOPHILIZED, FOR SOLUTION INTRAVENOUS at 01:37

## 2021-04-06 RX ADMIN — CEFEPIME HYDROCHLORIDE 2000 MG: 2 INJECTION, POWDER, FOR SOLUTION INTRAVENOUS at 00:39

## 2021-04-06 NOTE — ED NOTES
Report called to Qamar Quezada RN at [facility], [place]  04/06/21 7163

## 2021-04-06 NOTE — ED NOTES
Lissett Zaragoza, RN at [facility] again to report Rapid COVID result     [address] [facility] Rowan Churchill RN  04/06/21 0019

## 2021-04-06 NOTE — ED NOTES
Patient family is at bedside and asked to speak with supervisor. Charge nurse went into room. Patient family declined use of  pad. Patient family member is upset that patient has raised bruise on hand where nurse had tried to start an IV. 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 She states that she feels the nurse should not have tried to draw labs out of her mothers hand and that she did not know what she was doing. She is also upset that mother was receiving care without family there. Nurse explained to family member that that is why the  pad was at bedside and it was being used to communicate with her mother. She stated no one has been in the room to talk to them and I told family member that the doctor had previously been in and tried to contact family on numbers that we had available in the system. She states that no one received a call and she had given EMS the correct numbers, this nurse saw no paperwork from EMS in the room. She also inquired about mother being moved to [facility]. She states that her mothers doctor has told them that all care dealing with her mothers drains should be done at [facility]. Explained that Dr. Brynn Cuevas will contact doctor at [facility] after getting results and that if patient needs to be moved they will start that process at that time. Family member states she would like to speak with Dr. Brynn Cuevas immediately. Nurse notified Dr. Brynn Cuevas, who was in a room with another patient. He will speak with family as soon as he can. Update family members.         Teri Jimenez, RN  04/05/21 2357

## 2021-04-06 NOTE — ED NOTES
Patient requested that IV to be placed in hand because she does not like \"not being able to bend her arm\" when it is placed in the [facility]. This RN attempted to place an IV in patient's right hand, but was unsuccessful. Vein blew and started to develop hematoma. Pressure dressing applied and RN instructed patient to leave dressing on. Patient verbalized understanding.      [address] [facility] Rowan Churchill RN  04/05/21 9247

## 2021-04-06 NOTE — ED NOTES
Per policy, blood cultures must be obtained from two different sites. RN explained this to patient, but patient refused any more blood work via venipuncture. RN notified Dr. Muñiz. Received verbal ok from Dr. Muñiz to draw both sets of blood cultures out of IV line. RN told patient that blood would be taken out of IV instead of venipuncture and she stated she was ok with that. RN called lab and notified them of situation.       [address] [facility] Rowan Churchill RN  04/06/21 7392

## 2021-04-08 LAB — URINE CULTURE, ROUTINE: NORMAL

## 2021-04-11 LAB
BLOOD CULTURE, ROUTINE: NORMAL
CULTURE, BLOOD 2: NORMAL

## 2021-04-16 ENCOUNTER — HOSPITAL ENCOUNTER (INPATIENT)
Age: 76
LOS: 1 days | Discharge: HOME | DRG: 426 | End: 2021-04-18
Attending: EMERGENCY MEDICINE | Admitting: INTERNAL MEDICINE
Payer: MEDICAID

## 2021-04-16 DIAGNOSIS — R45.851 SUICIDAL IDEATION: ICD-10-CM

## 2021-04-16 DIAGNOSIS — E87.1 HYPONATREMIA: Primary | ICD-10-CM

## 2021-04-16 DIAGNOSIS — Z43.6 ATTENTION TO UROSTOMY (HCC): ICD-10-CM

## 2021-04-16 DIAGNOSIS — N39.0 URINARY TRACT INFECTION ASSOCIATED WITH NEPHROSTOMY CATHETER, INITIAL ENCOUNTER (HCC): ICD-10-CM

## 2021-04-16 DIAGNOSIS — T83.512A URINARY TRACT INFECTION ASSOCIATED WITH NEPHROSTOMY CATHETER, INITIAL ENCOUNTER (HCC): ICD-10-CM

## 2021-04-16 LAB
ALBUMIN SERPL-MCNC: 4 G/DL (ref 3.5–5.2)
ALP BLD-CCNC: 156 U/L (ref 35–104)
ALT SERPL-CCNC: 9 U/L (ref 0–32)
ANION GAP SERPL CALCULATED.3IONS-SCNC: 7 MMOL/L (ref 7–16)
AST SERPL-CCNC: 16 U/L (ref 0–31)
BACTERIA: ABNORMAL /HPF
BASOPHILS ABSOLUTE: 0.03 E9/L (ref 0–0.2)
BASOPHILS RELATIVE PERCENT: 0.4 % (ref 0–2)
BILIRUB SERPL-MCNC: <0.2 MG/DL (ref 0–1.2)
BILIRUBIN URINE: NEGATIVE
BLOOD, URINE: ABNORMAL
BUN BLDV-MCNC: 17 MG/DL (ref 8–23)
CALCIUM SERPL-MCNC: 9.6 MG/DL (ref 8.6–10.2)
CHLORIDE BLD-SCNC: 96 MMOL/L (ref 98–107)
CLARITY: ABNORMAL
CO2: 24 MMOL/L (ref 22–29)
COLOR: YELLOW
CREAT SERPL-MCNC: 0.5 MG/DL (ref 0.5–1)
EOSINOPHILS ABSOLUTE: 0.2 E9/L (ref 0.05–0.5)
EOSINOPHILS RELATIVE PERCENT: 2.5 % (ref 0–6)
GFR AFRICAN AMERICAN: >60
GFR NON-AFRICAN AMERICAN: >60 ML/MIN/1.73
GLUCOSE BLD-MCNC: 112 MG/DL (ref 74–99)
GLUCOSE URINE: NEGATIVE MG/DL
HCT VFR BLD CALC: 35.1 % (ref 34–48)
HEMOGLOBIN: 11.5 G/DL (ref 11.5–15.5)
IMMATURE GRANULOCYTES #: 0.03 E9/L
IMMATURE GRANULOCYTES %: 0.4 % (ref 0–5)
KETONES, URINE: NEGATIVE MG/DL
LEUKOCYTE ESTERASE, URINE: ABNORMAL
LYMPHOCYTES ABSOLUTE: 1.78 E9/L (ref 1.5–4)
LYMPHOCYTES RELATIVE PERCENT: 22.6 % (ref 20–42)
MCH RBC QN AUTO: 31.3 PG (ref 26–35)
MCHC RBC AUTO-ENTMCNC: 32.8 % (ref 32–34.5)
MCV RBC AUTO: 95.6 FL (ref 80–99.9)
MONOCYTES ABSOLUTE: 0.75 E9/L (ref 0.1–0.95)
MONOCYTES RELATIVE PERCENT: 9.5 % (ref 2–12)
NEUTROPHILS ABSOLUTE: 5.07 E9/L (ref 1.8–7.3)
NEUTROPHILS RELATIVE PERCENT: 64.6 % (ref 43–80)
NITRITE, URINE: POSITIVE
PDW BLD-RTO: 15.7 FL (ref 11.5–15)
PH UA: 5.5 (ref 5–9)
PLATELET # BLD: 390 E9/L (ref 130–450)
PMV BLD AUTO: 9.1 FL (ref 7–12)
POTASSIUM REFLEX MAGNESIUM: 4.1 MMOL/L (ref 3.5–5)
PROTEIN UA: 100 MG/DL
RBC # BLD: 3.67 E12/L (ref 3.5–5.5)
RBC UA: ABNORMAL /HPF (ref 0–2)
REASON FOR REJECTION: NORMAL
REJECTED TEST: NORMAL
SODIUM BLD-SCNC: 127 MMOL/L (ref 132–146)
SPECIFIC GRAVITY UA: >=1.03 (ref 1–1.03)
TOTAL PROTEIN: 7.7 G/DL (ref 6.4–8.3)
TROPONIN: <0.01 NG/ML (ref 0–0.03)
UROBILINOGEN, URINE: 0.2 E.U./DL
WBC # BLD: 7.9 E9/L (ref 4.5–11.5)
WBC UA: ABNORMAL /HPF (ref 0–5)
YEAST: PRESENT /HPF

## 2021-04-16 PROCEDURE — 93005 ELECTROCARDIOGRAM TRACING: CPT | Performed by: STUDENT IN AN ORGANIZED HEALTH CARE EDUCATION/TRAINING PROGRAM

## 2021-04-16 PROCEDURE — 85025 COMPLETE CBC W/AUTO DIFF WBC: CPT

## 2021-04-16 PROCEDURE — 81001 URINALYSIS AUTO W/SCOPE: CPT

## 2021-04-16 PROCEDURE — 36415 COLL VENOUS BLD VENIPUNCTURE: CPT

## 2021-04-16 PROCEDURE — 99284 EMERGENCY DEPT VISIT MOD MDM: CPT

## 2021-04-16 PROCEDURE — 80053 COMPREHEN METABOLIC PANEL: CPT

## 2021-04-16 PROCEDURE — 84484 ASSAY OF TROPONIN QUANT: CPT

## 2021-04-16 NOTE — ED PROVIDER NOTES
This is a 68-year-old female with history of cervical cancer, status post nephrostomy tubes (done in [place] in Nov. 2020 per family) she is presenting to  emergency department for concern of UTI. She denies any symptoms, denies any abdominal pain or fevers, is unable to say why she is concerned about a UTI. She states that her family was concerned that she appeared weaker than usual. she states that she lives with her daughter John smith was concerned because she looked weaker than normal, was concerned that she might be hypokalemia because she normally gets that way when her potassium is low. She was recently seen in the emergency department on 4/5/2021, was transferred to [facility] with concern for urinary tract infection. Her urine culture at that time grew out staph aureus, thought by ID to be related to contamination but was continued on 7 days of Bactrim at discharge. Report from EMS is that patient is followed by [facility], did not follow-up with her appointment [facility] after discharge from the hospital.  He also reports that she was fired by home health because she did not answer the door when he tried to come. Patient states that she just was not home when they tried to come. Spoke extensively with daughter and granddaughter over the phone, they were concerned about patient's decreased p.o. intake, not wanting to take her medications, not sleeping much, frequently expressing suicidal ideation. Daughter and granddaughter state that they did not avoid home health when they came to the house but that they never called. Patient states she was not home when home health came. According to daughter and granddaughter patient did not finish course of Bactrim after being discharged from [facility]. Patient's family states that patient is finished with radiation and chemotherapy. The history is provided by the patient, medical records, a relative and the EMS personnel.  The history is limited by a language barrier and the absence of a caregiver. A  was used. Review of Systems   Constitutional: Negative for chills and fever. HENT: Negative for ear pain, sinus pressure and sore throat. Eyes: Negative for visual disturbance. Respiratory: Negative for cough, shortness of breath and wheezing. Cardiovascular: Negative for chest pain. Gastrointestinal: Negative for abdominal distention, diarrhea, nausea and vomiting. Genitourinary: Negative for dysuria and frequency. Musculoskeletal: Negative for arthralgias and back pain. Skin: Negative for rash and wound. Neurological: Negative for weakness and headaches. Hematological: Negative for adenopathy. Psychiatric/Behavioral: Negative for hallucinations. All other systems reviewed and are negative. Physical Exam  Vitals signs and nursing note reviewed. Constitutional:       Appearance: She is not ill-appearing or diaphoretic. Comments: Laying in bed, comfortable. HENT:      Head: Normocephalic and atraumatic. Right Ear: External ear normal.      Left Ear: External ear normal.      Nose: Nose normal.      Mouth/Throat:      Mouth: Mucous membranes are moist.   Eyes:      Extraocular Movements: Extraocular movements intact. Conjunctiva/sclera: Conjunctivae normal.      Pupils: Pupils are equal, round, and reactive to light. Neck:      Musculoskeletal: Normal range of motion and neck supple. Cardiovascular:      Rate and Rhythm: Normal rate and regular rhythm. Heart sounds: Normal heart sounds. Pulmonary:      Effort: Pulmonary effort is normal. No respiratory distress. Breath sounds: Normal breath sounds. No wheezing, rhonchi or rales. Chest:      Chest wall: No tenderness. Abdominal:      General: Abdomen is flat. There is no distension. Palpations: Abdomen is soft. Tenderness: There is no abdominal tenderness. There is no guarding or rebound.    Musculoskeletal: General: No swelling or deformity. Right lower leg: No edema. Left lower leg: No edema. Skin:     General: Skin is warm and dry. Capillary Refill: Capillary refill takes less than 2 seconds. Comments: Bilateral urostomy tubes noted   Neurological:      General: No focal deficit present. Mental Status: She is alert. Motor: No weakness. Psychiatric:         Mood and Affect: Mood normal.         Behavior: Behavior normal.         Thought Content: Thought content normal.          Procedures     MDM  Number of Diagnoses or Management Options  Attention to urostomy [facility])  Hyponatremia  Suicidal ideation  Urinary tract infection associated with nephrostomy catheter, initial encounter [facility])  Diagnosis management comments: This is a 64-year-old female with history of cervical cancer status post nephrostomy tube placement. She is Icelandic-speaking only, lives with her daughter and granddaughter at home. She [facility] for concern of urinary tract infection associated with her nephrostomy tubes, this was thought to be most likely chronic colonization but was placed on 7 days of Bactrim after discharge. After discharge patient did not follow-up with [facility], was not able to be followed up with home health. Family is concerned because patient has not been eating or drinking much, has not been taking her medication, has not appeared to be sleeping much. Family is also concerned because patient has frequently expressed suicidal ideation ever since her cancer diagnosis. Family feels like patient needs placement but is concerned because when they bring this topic up with patient she states that she will kill herself. The majority of history is obtained from family, patient is a very difficult historian. Work-up is remarkable for evidence of hyponatremia, patient appears clinically to be dry, was given cautious fluid bolus.   Troponin and EKG were obtained given reports of generalized weakness by family, these were reassuring. Patient also has evidence of bacterial/yeast colonization of urostomy versus urinary tract infection. Given concern for possible urinary tract infection, cultures were sent, patient was given dose of cefepime here in emergency department pending cultures. Patient has no signs or symptoms of systemic infection. Given hyponatremia, possible complicated urinary tract infection, and social issues revolving around possible need for placement, decision was made to admit patient to the hospital.  Family was agreeable with this, patient was also agreeable. ED Course as of Apr 17 1334 Fri Apr 16, 2021   2151 EKG normal sinus rhythm, rate of 100, normal axis, no acute ischemic changes. [RH]   6590 Spoke with Los Riley, patient's grandaughter who is translating for Nas Mccracken, patient's daughter. She says patient has been talking about wanting to kill herself since finding out she had cancer.     [RH]   2515 Patient's granddaughter and daughter state that patient had cervical cancer, had bilateral nephrostomies done in [facility] in November 2020. States that since her cancer diagnosis she has not been herself, has talked multiple times about wanting to kill herself. She has not been wanting to eat or drink has not been sleeping much and not wanting to take any of her medications, did not finish her course of Bactrim she was prescribed. Family is very concerned about her, state that they want her to be placed in a facility where she can get higher level of care the patient has threatened to kill herself if she is placed in facility. [RH]   Sat Apr 17, 2021   0006 Granddaughter and daughter state that patient is no longer getting any chemotherapy or radiation therapy. [RH]   0016 Discussed case with Dr. Chaka Coreas, he agreed to admit patient.      [RH]      ED Course User Index  [RH] [address], DO      ED Course as of Apr 17 1334 Fri Apr orders placed or performed during the hospital encounter of 04/16/21   Comprehensive Metabolic Panel w/ Reflex to MG   Result Value Ref Range    Sodium 127 (L) 132 - 146 mmol/L    Potassium reflex Magnesium 4.1 3.5 - 5.0 mmol/L    Chloride 96 (L) 98 - 107 mmol/L    CO2 24 22 - 29 mmol/L    Anion Gap 7 7 - 16 mmol/L    Glucose 112 (H) 74 - 99 mg/dL    BUN 17 8 - 23 mg/dL    CREATININE 0.5 0.5 - 1.0 mg/dL    GFR Non-African American >60 >=60 mL/min/1.73    GFR African American >60     Calcium 9.6 8.6 - 10.2 mg/dL    Total Protein 7.7 6.4 - 8.3 g/dL    Albumin 4.0 3.5 - 5.2 g/dL    Total Bilirubin <0.2 0.0 - 1.2 mg/dL    Alkaline Phosphatase 156 (H) 35 - 104 U/L    ALT 9 0 - 32 U/L    AST 16 0 - 31 U/L   Troponin   Result Value Ref Range    Troponin <0.01 0.00 - 0.03 ng/mL   Urinalysis with Microscopic   Result Value Ref Range    Color, UA Yellow Straw/Yellow    Clarity, UA SL CLOUDY Clear    Glucose, Ur Negative Negative mg/dL    Bilirubin Urine Negative Negative    Ketones, Urine Negative Negative mg/dL    Specific Gravity, UA >=1.030 1.005 - 1.030    Blood, Urine MODERATE (A) Negative    pH, UA 5.5 5.0 - 9.0    Protein,  (A) Negative mg/dL    Urobilinogen, Urine 0.2 <2.0 E.U./dL    Nitrite, Urine POSITIVE (A) Negative    Leukocyte Esterase, Urine SMALL (A) Negative    WBC, UA 2-5 0 - 5 /HPF    RBC, UA NONE 0 - 2 /HPF    Bacteria, UA MODERATE (A) None Seen /HPF    Yeast, UA Present (A) None Seen /HPF   SPECIMEN REJECTION   Result Value Ref Range    Rejected Test cbcwd     Reason for Rejection see below    CBC auto differential   Result Value Ref Range    WBC 7.9 4.5 - 11.5 E9/L    RBC 3.67 3.50 - 5.50 E12/L    Hemoglobin 11.5 11.5 - 15.5 g/dL    Hematocrit 35.1 34.0 - 48.0 %    MCV 95.6 80.0 - 99.9 fL    MCH 31.3 26.0 - 35.0 pg    MCHC 32.8 32.0 - 34.5 %    RDW 15.7 (H) 11.5 - 15.0 fL    Platelets 157 066 - 589 E9/L    MPV 9.1 7.0 - 12.0 fL    Neutrophils % 64.6 43.0 - 80.0 %    Immature Granulocytes % 0.4 0.0 RADIOLOGY:  No orders to display       EKG: This EKG is signed and interpreted by me. Rate: 100  Rhythm: Sinus  Interpretation: no acute changes  Comparison: no previous EKG available      ------------------------- NURSING NOTES AND VITALS REVIEWED ---------------------------  Date / Time Roomed:  4/16/2021  5:23 PM  ED Bed Assignment:  0520/0520-A    The nursing notes within the ED encounter and vital signs as below have been reviewed. Patient Vitals for the past 24 hrs:   BP Temp Temp src Pulse Resp SpO2 Height Weight   04/17/21 0816 (!) 112/54 98.5 °F (36.9 °C) Oral 79 16 97 % -- --   04/17/21 0150 121/76 98.3 °F (36.8 °C) Oral 89 16 98 % 5' 2\" (1.575 m) 141 lb (64 kg)   04/17/21 0018 (!) 113/56 98 °F (36.7 °C) -- 93 16 99 % -- --   04/16/21 1730 112/77 98 °F (36.7 °C) Oral 102 14 99 % 5' 2\" (1.575 m) 136 lb (61.7 kg)       Oxygen Saturation Interpretation: Normal    ------------------------------------------ PROGRESS NOTES ------------------------------------------  Re-evaluation(s):  See ED COURSE    Counseling:  I have spoken with the patient and daughter and granddaughter and discussed todays results, in addition to providing specific details for the plan of care and counseling regarding the diagnosis and prognosis. Their questions are answered at this time and they are agreeable with the plan of admission.    --------------------------------- ADDITIONAL PROVIDER NOTES ---------------------------------  Consultations:  See ED COURSE  This patient's ED course included: a personal history and physicial examination, re-evaluation prior to disposition, multiple bedside re-evaluations and IV medications    This patient has remained hemodynamically stable during their ED course. Diagnosis:  1. Hyponatremia    2. Attention to urostomy ([facility]    3. Urinary tract infection associated with nephrostomy catheter, initial encounter ([facility]    4.  Suicidal ideation        Disposition:  Patient's disposition: Admit to med/surg floor  Patient's condition is stable.              [address] DO  Resident  04/17/21 1140

## 2021-04-17 LAB
ANION GAP SERPL CALCULATED.3IONS-SCNC: 7 MMOL/L (ref 7–16)
BASOPHILS ABSOLUTE: 0.03 E9/L (ref 0–0.2)
BASOPHILS RELATIVE PERCENT: 0.5 % (ref 0–2)
BUN BLDV-MCNC: 15 MG/DL (ref 8–23)
CALCIUM SERPL-MCNC: 9.5 MG/DL (ref 8.6–10.2)
CHLORIDE BLD-SCNC: 104 MMOL/L (ref 98–107)
CO2: 22 MMOL/L (ref 22–29)
CREAT SERPL-MCNC: 0.5 MG/DL (ref 0.5–1)
CREATININE URINE: 52 MG/DL (ref 29–226)
EOSINOPHILS ABSOLUTE: 0.3 E9/L (ref 0.05–0.5)
EOSINOPHILS RELATIVE PERCENT: 4.6 % (ref 0–6)
GFR AFRICAN AMERICAN: >60
GFR NON-AFRICAN AMERICAN: >60 ML/MIN/1.73
GLUCOSE BLD-MCNC: 105 MG/DL (ref 74–99)
HCT VFR BLD CALC: 32.2 % (ref 34–48)
HEMOGLOBIN: 10.6 G/DL (ref 11.5–15.5)
IMMATURE GRANULOCYTES #: 0.01 E9/L
IMMATURE GRANULOCYTES %: 0.2 % (ref 0–5)
LYMPHOCYTES ABSOLUTE: 1.4 E9/L (ref 1.5–4)
LYMPHOCYTES RELATIVE PERCENT: 21.3 % (ref 20–42)
MCH RBC QN AUTO: 31.1 PG (ref 26–35)
MCHC RBC AUTO-ENTMCNC: 32.9 % (ref 32–34.5)
MCV RBC AUTO: 94.4 FL (ref 80–99.9)
MONOCYTES ABSOLUTE: 0.84 E9/L (ref 0.1–0.95)
MONOCYTES RELATIVE PERCENT: 12.8 % (ref 2–12)
NEUTROPHILS ABSOLUTE: 3.99 E9/L (ref 1.8–7.3)
NEUTROPHILS RELATIVE PERCENT: 60.6 % (ref 43–80)
OSMOLALITY URINE: 512 MOSM/KG (ref 300–900)
PDW BLD-RTO: 15.5 FL (ref 11.5–15)
PLATELET # BLD: 354 E9/L (ref 130–450)
PMV BLD AUTO: 8.7 FL (ref 7–12)
POTASSIUM REFLEX MAGNESIUM: 4.2 MMOL/L (ref 3.5–5)
RBC # BLD: 3.41 E12/L (ref 3.5–5.5)
SODIUM BLD-SCNC: 133 MMOL/L (ref 132–146)
SODIUM URINE: 113 MMOL/L
WBC # BLD: 6.6 E9/L (ref 4.5–11.5)

## 2021-04-17 PROCEDURE — 36415 COLL VENOUS BLD VENIPUNCTURE: CPT

## 2021-04-17 PROCEDURE — 99223 1ST HOSP IP/OBS HIGH 75: CPT | Performed by: INTERNAL MEDICINE

## 2021-04-17 PROCEDURE — 85025 COMPLETE CBC W/AUTO DIFF WBC: CPT

## 2021-04-17 PROCEDURE — 1200000000 HC SEMI PRIVATE

## 2021-04-17 PROCEDURE — 6370000000 HC RX 637 (ALT 250 FOR IP): Performed by: FAMILY MEDICINE

## 2021-04-17 PROCEDURE — 84300 ASSAY OF URINE SODIUM: CPT

## 2021-04-17 PROCEDURE — 6360000002 HC RX W HCPCS: Performed by: INTERNAL MEDICINE

## 2021-04-17 PROCEDURE — 80048 BASIC METABOLIC PNL TOTAL CA: CPT

## 2021-04-17 PROCEDURE — 99221 1ST HOSP IP/OBS SF/LOW 40: CPT | Performed by: FAMILY MEDICINE

## 2021-04-17 PROCEDURE — 2580000003 HC RX 258: Performed by: STUDENT IN AN ORGANIZED HEALTH CARE EDUCATION/TRAINING PROGRAM

## 2021-04-17 PROCEDURE — 82570 ASSAY OF URINE CREATININE: CPT

## 2021-04-17 PROCEDURE — 6360000002 HC RX W HCPCS: Performed by: STUDENT IN AN ORGANIZED HEALTH CARE EDUCATION/TRAINING PROGRAM

## 2021-04-17 PROCEDURE — 83935 ASSAY OF URINE OSMOLALITY: CPT

## 2021-04-17 PROCEDURE — 2580000003 HC RX 258: Performed by: INTERNAL MEDICINE

## 2021-04-17 RX ORDER — LANOLIN ALCOHOL/MO/W.PET/CERES
9 CREAM (GRAM) TOPICAL NIGHTLY
Status: DISCONTINUED | OUTPATIENT
Start: 2021-04-17 | End: 2021-04-18 | Stop reason: HOSPADM

## 2021-04-17 RX ORDER — SODIUM CHLORIDE 0.9 % (FLUSH) 0.9 %
10 SYRINGE (ML) INJECTION PRN
Status: DISCONTINUED | OUTPATIENT
Start: 2021-04-17 | End: 2021-04-18 | Stop reason: HOSPADM

## 2021-04-17 RX ORDER — 0.9 % SODIUM CHLORIDE 0.9 %
500 INTRAVENOUS SOLUTION INTRAVENOUS ONCE
Status: COMPLETED | OUTPATIENT
Start: 2021-04-17 | End: 2021-04-17

## 2021-04-17 RX ORDER — MIRTAZAPINE 15 MG/1
7.5 TABLET, FILM COATED ORAL NIGHTLY
Status: DISCONTINUED | OUTPATIENT
Start: 2021-04-17 | End: 2021-04-18 | Stop reason: HOSPADM

## 2021-04-17 RX ORDER — ACETAMINOPHEN 325 MG/1
650 TABLET ORAL EVERY 6 HOURS PRN
Status: DISCONTINUED | OUTPATIENT
Start: 2021-04-17 | End: 2021-04-18 | Stop reason: HOSPADM

## 2021-04-17 RX ORDER — POLYETHYLENE GLYCOL 3350 17 G/17G
17 POWDER, FOR SOLUTION ORAL DAILY PRN
Status: DISCONTINUED | OUTPATIENT
Start: 2021-04-17 | End: 2021-04-18 | Stop reason: HOSPADM

## 2021-04-17 RX ORDER — ACETAMINOPHEN 650 MG/1
650 SUPPOSITORY RECTAL EVERY 6 HOURS PRN
Status: DISCONTINUED | OUTPATIENT
Start: 2021-04-17 | End: 2021-04-18 | Stop reason: HOSPADM

## 2021-04-17 RX ORDER — SODIUM CHLORIDE 9 MG/ML
25 INJECTION, SOLUTION INTRAVENOUS PRN
Status: DISCONTINUED | OUTPATIENT
Start: 2021-04-17 | End: 2021-04-18 | Stop reason: HOSPADM

## 2021-04-17 RX ORDER — SODIUM CHLORIDE 0.9 % (FLUSH) 0.9 %
10 SYRINGE (ML) INJECTION EVERY 12 HOURS SCHEDULED
Status: DISCONTINUED | OUTPATIENT
Start: 2021-04-17 | End: 2021-04-18 | Stop reason: HOSPADM

## 2021-04-17 RX ORDER — SODIUM CHLORIDE 9 MG/ML
INJECTION, SOLUTION INTRAVENOUS CONTINUOUS
Status: DISCONTINUED | OUTPATIENT
Start: 2021-04-17 | End: 2021-04-18 | Stop reason: HOSPADM

## 2021-04-17 RX ADMIN — SODIUM CHLORIDE: 9 INJECTION, SOLUTION INTRAVENOUS at 02:00

## 2021-04-17 RX ADMIN — MIRTAZAPINE 7.5 MG: 15 TABLET, FILM COATED ORAL at 21:36

## 2021-04-17 RX ADMIN — SODIUM CHLORIDE: 9 INJECTION, SOLUTION INTRAVENOUS at 21:36

## 2021-04-17 RX ADMIN — Medication 9 MG: at 21:36

## 2021-04-17 RX ADMIN — SODIUM CHLORIDE 500 ML: 9 INJECTION, SOLUTION INTRAVENOUS at 00:14

## 2021-04-17 RX ADMIN — CEFEPIME HYDROCHLORIDE 2000 MG: 2 INJECTION, POWDER, FOR SOLUTION INTRAVENOUS at 00:14

## 2021-04-17 ASSESSMENT — ENCOUNTER SYMPTOMS
SINUS PRESSURE: 0
ABDOMINAL DISTENTION: 0
NAUSEA: 0
WHEEZING: 0
VOMITING: 0
DIARRHEA: 0
BACK PAIN: 0
SORE THROAT: 0
COUGH: 0
SHORTNESS OF BREATH: 0

## 2021-04-17 NOTE — PROGRESS NOTES
Used  ipad for assessment. Patient was able to tell me that she was in the hospital for an infection in her urine (kept lifting up nephrostomy tubes), when I asked her what her birthday was or what year it was she kept saying she doesn't know \"I don't know, I dont know how to read\". Patient stated that she was having no pain.

## 2021-04-17 NOTE — CONSULTS
Palliative Care Department  [phone]  Palliative Care Initial Consult  Provider [id number] [address]  [id number]  Hospital Day: 2  Date of Initial Consult: 4/17/2021  Referring Provider: Teressa Tan MD  Palliative Medicine was consulted for assistance with: [facility]    HPI:   Ryan Kaba is a 64 y.o. 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 Capacity is time limited and situation/question specific   During encounter darcy Franco was surrogate medical decision-maker   Outcome of goals of care meeting: no intubation, unsure about CPR, want to talk about it overnight   Code status Limited no intubation, unsure about CPR  o Over the past 40 years, despite advances in the protocol for Cardiopulmonary Resuscitation (CPR) outcomes for survival have not statistically changed. o The consensus is that 17% of all adult arrest patients survive to hospital discharge. Many factors lower a patients chance of survival, including advanced age, performance status, malignancy, and presence of multiple comorbidities. Silvia Grossman and colleagues examined medical data from [id number],996 Medicare patients 72 and older who underwent in-hospital CPR. 5 Both older age and prior residence in a skilled nursing facility were found to be associated with poorer survival rates.   o Patients 85 and older having only a 6% chance of surviving to hospital discharge.  o In general Cancer patients generally have an overall survival rate of only 6.2%. o Cancer patients whose hospital course followed a path of gradual deterioration showed a 0% survival rate.  o The presence of hepatic insufficiency, acute stroke, immunodeficiency, renal failure, or dialysis were associated with lower survival rates.  Advanced Directives: no POA or living will in [facility]    Surrogate/Legal NOK:  o Daughter Stephanie Agee [phone]  o Granddaughter Fernandez Jordan [phone]    Spiritual assessment: no spiritual distress identified  Bereavement and grief: to be determined  Referrals to: none today   SUBJECTIVE:     Current medical issues leading to Palliative Medicine involvement include   Active Hospital Problems    Diagnosis Date Noted    Hyponatremia [E87.1] 01/17/2021       Details of Conversation:  Introduced self and PM to patient. Patient denies pain, denies symptoms. Daughter concerned that patient is suicidal, states she has mentioned 4 times since being diagnosed with cancer that she wants to kill herself. States patient always laying down, \"spaced out\" and \"not listening\", has never been evaluated by psychiatry before. Not sleeping, not wanting to eat. Discussed starting melatonin for sleep, and remeron for boosting mood and appetite. Messaged primary team regarding possible psych consult. Will talk again tomorrow.       OBJECTIVE:   Prognosis: Guarded    Physical Exam:  /76   Pulse 89   Temp 98.3 °F (36.8 °C) (Oral)   Resp 16   Ht 5' 2\" (1.575 m)   Wt 141 lb (64 kg)   SpO2 98%   BMI 25.79 kg/m²   Constitutional:  Elderly, thin, NAD, awake, alert  Eyes: no scleral icterus, normal lids, no discharge  ENMT:  Normocephalic, atraumatic, mucosa moist, EOMI  Neck:  trachea midline, no JVD  Lungs:  CTA bilaterally, no audible rhonchi or wheezes noted, respirations unlabored, no retractions  Heart[de-identified]  RRR, distant heart tones, no murmur, rub, or gallop noted during exam  Abd:  Soft, non tender, non distended, bowel sounds present  :  deferred  MSK: sarcopenia absent  Ext:  Moving all extremities, no edema, pulses present  Skin:  Warm and dry, no rashes on visible skin  Psych: non-anxious affect, cooperative  Neuro:  PERRL, Alert, grossly nonfocal; following commands    Objective data reviewed: labs, images, records, medication use, vitals and chart    Discussed patient and the plan of care with the other IDT members: Palliative Medicine IDT Team, Primary Team, Floor Nurse, Patient and Family    Time/Communication  Greater than 50% of time spent, total 30 minutes in counseling and coordination of care at the bedside regarding goals of care, symptom management, diagnosis and prognosis and see above. Thank you for allowing Palliative Medicine to participate in the care of Vle Hartman.

## 2021-04-17 NOTE — PROGRESS NOTES
Spoke with pt via interrupter and she stated that we would need to call her daughter Yisel Torres in the morning around [phone] to verify home medications, pharmacy, and medical and surgical history.

## 2021-04-17 NOTE — H&P
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 Daughter called EMS reportedly due to generalized weakness and they were concerned for various etiologies such as electrolyte imbalance urinary tract infection and poor p.o. intake. Had home health care but she was fired by them after not answering the door purposely. They also mention the patient has been more depressed recently and in the past declined nursing home placement which they believe is due to depression. The above was obtained by the ED team from the daughter over the phone. The patient however does not state any of this, says she was sent to the ED due to her high blood pressure. Informant(s) for H&P: Patient through [id number] . Also chart review. REVIEW OF SYSTEMS:  A comprehensive 14 point review of systems was negative except for: what is in the HPI    PMH:  Past Medical History:   Diagnosis Date    Cancer [facility])        Surgical History:  Past Surgical History:   Procedure Laterality Date    APPENDECTOMY         Medications Prior to Admission:    Prior to Admission medications    Medication Sig Start Date End Date Taking? Authorizing Provider   acetaminophen (TYLENOL) 325 MG tablet Take 650 mg by mouth every 6 hours as needed for Pain    Historical Provider, MD       Allergies:    Patient has no known allergies. Social History:    reports that she has never smoked. She has never used smokeless tobacco. She reports previous alcohol use. She reports previous drug use. Family History:    Mother with cancer    PHYSICAL EXAM:  Vitals:  BP (!) 113/56   Pulse 93   Temp 98 °F (36.7 °C) (Oral)   Resp 16 Ht 5' 2\" (1.575 m)   Wt 136 lb (61.7 kg)   SpO2 99%   BMI 24.87 kg/m²   General Appearance: alert and oriented to person, place and time and in no acute distress  Skin: warm and dry, turgor not diminished  Head: normocephalic and atraumatic  Eyes: pupils equal, round, and reactive to light, extraocular eye movements intact, conjunctivae normal  Neck: neck supple and non tender without mass   Pulmonary/Chest: clear to auscultation bilaterally- no wheezes, rales or rhonchi, normal air movement, no respiratory distress  Cardiovascular: normal rate, normal S1 and S2 and no M/R/R  Abdomen: soft, non-tender, non-distended, normal bowel sounds, no masses or organomegaly. Nephrostomy tubes draining clear urine  Extremities: no cyanosis, no clubbing and no edema  Neurologic: no cranial nerve deficit and speech normal        LABS:  Recent Labs     04/16/21  2038   *   K 4.1   CL 96*   CO2 24   BUN 17   CREATININE 0.5   GLUCOSE 112*   CALCIUM 9.6       Recent Labs     04/16/21  2119   WBC 7.9   RBC 3.67   HGB 11.5   HCT 35.1   MCV 95.6   MCH 31.3   MCHC 32.8   RDW 15.7*      MPV 9.1       No results for input(s): POCGLU in the last 72 hours. Radiology:   No orders to display       EKG: No acute normality    ASSESSMENT:     Hyponatremia  Generalized weakness  Stage IV locally advanced cervical cancer  Vesicovaginal fistula  Urinary obstruction status post bilateral nephrostomy tubes      PLAN:  Hyponatremia: Urine lites. IV fluids. Fluid restriction. Generalized weakness: PT/OT/SW. Doubt UTI, discontinue to biotics. Likely poor po intake. Consult palliative    Cervical cancer: [facility]. Planned for PET scan in 3 months and follow-up with urology    Code Status: full  DVT prophylaxis: lovenox      NOTE: This report was transcribed using voice recognition software. Every effort was made to ensure accuracy; however, inadvertent computerized transcription errors may be present.   Electronically

## 2021-04-17 NOTE — PROGRESS NOTES
Patient admitted after midnight. Please see H&P for more details on admission    Use of  at bedside. Patient states that she has no symptoms and is feeling well. She states that she came to the hospital because of a kidney infection that she was already taking bactrim for. She denies fever, chills, pain and weakness. She was unclear on her PMH, but notes that her kidneys no longer work, and that is why she has nephrostomy tubes. There is some concern for dementia as well. Recent urine culture from 4/6/21 was negative. Chart review also reveals discussions with the patient's daughter regarding decreased PO intake and not wanting to take her medications. She may have also suggested suicidal ideation and is having more depressed mood. 1.  Hyponatremia-improved; urine lytes normal; IVF  2. Generalized weakness-improved per patient; PT/OT/SW consulted; recently checked for UTI and cultures negative, repeat cultures are pending; COVID testing negative  3. Cervical cancer-follows at [address]; PET scan planned in 3 months    4. Nephrostomy tubes-was planned for follow up with [facility] but did not follow up after her last discharge. 5.  Mood disorder; possible MDD-difficult to obtain social history from patient; there may also be some dementia. Will try to discuss further with patient's daughter.

## 2021-04-17 NOTE — PROGRESS NOTES
Patients daughter came to visit and was questioning why patient had a bruise on her R AC area from the ER.  I explained to her that sometimes bruising can occur where blood work, or an IV was placed and that it can be normal. Ice given to patient per the daughters request.

## 2021-04-18 VITALS
OXYGEN SATURATION: 99 % | HEART RATE: 85 BPM | DIASTOLIC BLOOD PRESSURE: 58 MMHG | SYSTOLIC BLOOD PRESSURE: 131 MMHG | HEIGHT: 62 IN | WEIGHT: 139.9 LBS | BODY MASS INDEX: 25.75 KG/M2 | TEMPERATURE: 98.5 F | RESPIRATION RATE: 16 BRPM

## 2021-04-18 LAB
ANION GAP SERPL CALCULATED.3IONS-SCNC: 7 MMOL/L (ref 7–16)
BASOPHILS ABSOLUTE: 0.02 E9/L (ref 0–0.2)
BASOPHILS RELATIVE PERCENT: 0.4 % (ref 0–2)
BUN BLDV-MCNC: 11 MG/DL (ref 8–23)
CALCIUM SERPL-MCNC: 9 MG/DL (ref 8.6–10.2)
CHLORIDE BLD-SCNC: 105 MMOL/L (ref 98–107)
CO2: 23 MMOL/L (ref 22–29)
CREAT SERPL-MCNC: 0.5 MG/DL (ref 0.5–1)
EKG ATRIAL RATE: 100 BPM
EKG P AXIS: 64 DEGREES
EKG P-R INTERVAL: 156 MS
EKG Q-T INTERVAL: 340 MS
EKG QRS DURATION: 76 MS
EKG QTC CALCULATION (BAZETT): 438 MS
EKG R AXIS: 62 DEGREES
EKG T AXIS: 63 DEGREES
EKG VENTRICULAR RATE: 100 BPM
EOSINOPHILS ABSOLUTE: 0.47 E9/L (ref 0.05–0.5)
EOSINOPHILS RELATIVE PERCENT: 10.4 % (ref 0–6)
GFR AFRICAN AMERICAN: >60
GFR NON-AFRICAN AMERICAN: >60 ML/MIN/1.73
GLUCOSE BLD-MCNC: 107 MG/DL (ref 74–99)
HCT VFR BLD CALC: 30.2 % (ref 34–48)
HEMOGLOBIN: 10 G/DL (ref 11.5–15.5)
IMMATURE GRANULOCYTES #: 0.01 E9/L
IMMATURE GRANULOCYTES %: 0.2 % (ref 0–5)
LYMPHOCYTES ABSOLUTE: 1.29 E9/L (ref 1.5–4)
LYMPHOCYTES RELATIVE PERCENT: 28.4 % (ref 20–42)
MCH RBC QN AUTO: 31.4 PG (ref 26–35)
MCHC RBC AUTO-ENTMCNC: 33.1 % (ref 32–34.5)
MCV RBC AUTO: 95 FL (ref 80–99.9)
MONOCYTES ABSOLUTE: 0.59 E9/L (ref 0.1–0.95)
MONOCYTES RELATIVE PERCENT: 13 % (ref 2–12)
NEUTROPHILS ABSOLUTE: 2.16 E9/L (ref 1.8–7.3)
NEUTROPHILS RELATIVE PERCENT: 47.6 % (ref 43–80)
PDW BLD-RTO: 15.2 FL (ref 11.5–15)
PLATELET # BLD: 319 E9/L (ref 130–450)
PMV BLD AUTO: 8.9 FL (ref 7–12)
POTASSIUM REFLEX MAGNESIUM: 3.9 MMOL/L (ref 3.5–5)
RBC # BLD: 3.18 E12/L (ref 3.5–5.5)
SODIUM BLD-SCNC: 135 MMOL/L (ref 132–146)
WBC # BLD: 4.5 E9/L (ref 4.5–11.5)

## 2021-04-18 PROCEDURE — 80048 BASIC METABOLIC PNL TOTAL CA: CPT

## 2021-04-18 PROCEDURE — 36415 COLL VENOUS BLD VENIPUNCTURE: CPT

## 2021-04-18 PROCEDURE — 99239 HOSP IP/OBS DSCHRG MGMT >30: CPT | Performed by: FAMILY MEDICINE

## 2021-04-18 PROCEDURE — 85025 COMPLETE CBC W/AUTO DIFF WBC: CPT

## 2021-04-18 PROCEDURE — 2580000003 HC RX 258: Performed by: INTERNAL MEDICINE

## 2021-04-18 RX ADMIN — SODIUM CHLORIDE: 9 INJECTION, SOLUTION INTRAVENOUS at 09:06

## 2021-04-18 NOTE — DISCHARGE SUMMARY
[facility] Physician Discharge Summary       No follow-up provider specified. Activity level: As tolerated     Dispo:home      Condition on discharge: Stable     Patient ID:  Lorna Julien  [id number]  64 y.o.  1945    Admit date: 4/16/2021    Discharge date and time:  4/18/2021  12:21 PM    Admission Diagnoses: Active Problems:    Hyponatremia    Goals of care, counseling/discussion    DNR (do not resuscitate) discussion    Palliative care by specialist  Resolved Problems:    * No resolved hospital problems. *      Discharge Diagnoses: Active Problems:    Hyponatremia    Goals of care, counseling/discussion    DNR (do not resuscitate) discussion    Palliative care by specialist  Resolved Problems:    * No resolved hospital problems. *      Consults:  IP CONSULT TO SOCIAL WORK  IP CONSULT TO PALLIATIVE CARE  IP CONSULT TO DIETITIAN    Procedures: none    Hospital Course:   Patient Lorna Julien is a 64 y.o. presented with Hyponatremia [E87.1]  1. Hyponatremia-improved with IVF; urine lytes normal  2. Generalized weakness-improved per patient-she is excited to go home; PT/OT/SW consulted; patient walked easily;recently treated for UTI with bactrim; COVID testing negative  3. Cervical cancer-follows at [address]; PET scan planned in 3 months    4. Nephrostomy tubes-was planned for follow up with [facility] but did not follow up after her last discharge; would still recommend outpatient follow up. 5.  Mood disorder; possible MDD-difficult to obtain social history from patient; there may also be some dementia.  Recommend outpatient follow up with PCP    Discharge Exam:    General Appearance: alert and oriented to person, place and time and in no acute distress  Skin: warm and dry  Head: normocephalic and atraumatic  Eyes: pupils equal, round, and reactive to light, extraocular eye movements intact, conjunctivae normal  Neck: neck supple and non tender without mass Pulmonary/Chest: clear to auscultation bilaterally- no wheezes, rales or rhonchi, normal air movement, no respiratory distress  Cardiovascular: normal rate, normal S1 and S2 and no carotid bruits  Abdomen: soft, non-tender, non-distended, normal bowel sounds, no masses or organomegaly  Extremities: no cyanosis, no clubbing and no edema  Neurologic: no cranial nerve deficit and speech normal    I/O last 3 completed shifts: In: 8735 [P.O.:360; I.V.:851]  Out: 1480 [Urine:1480]  I/O this shift:  In: 240 [P.O.:240]  Out: 500 [Urine:500]      LABS:  Recent Labs     04/16/21 2038 04/17/21  0658 04/18/21  0430   * 133 135   K 4.1 4.2 3.9   CL 96* 104 105   CO2 24 22 23   BUN 17 15 11   CREATININE 0.5 0.5 0.5   GLUCOSE 112* 105* 107*   CALCIUM 9.6 9.5 9.0       Recent Labs     04/16/21 2119 04/17/21  0658 04/18/21  0430   WBC 7.9 6.6 4.5   RBC 3.67 3.41* 3.18*   HGB 11.5 10.6* 10.0*   HCT 35.1 32.2* 30.2*   MCV 95.6 94.4 95.0   MCH 31.3 31.1 31.4   MCHC 32.8 32.9 33.1   RDW 15.7* 15.5* 15.2*    354 319   MPV 9.1 8.7 8.9       No results for input(s): POCGLU in the last 72 hours. Imaging:  No results found.     Patient Instructions:      Medication List      CONTINUE taking these medications    acetaminophen 325 MG tablet  Commonly known as: TYLENOL              Note that more than 30 minutes was spent in preparing discharge papers, discussing discharge with patient, medication review, etc.    Signed:  Electronically signed by Criss Ochoa MD on 4/18/2021 at 12:21 PM

## 2021-05-05 ENCOUNTER — APPOINTMENT (OUTPATIENT)
Dept: GENERAL RADIOLOGY | Age: 76
End: 2021-05-05
Payer: COMMERCIAL

## 2021-05-05 ENCOUNTER — HOSPITAL ENCOUNTER (EMERGENCY)
Age: 76
Discharge: HOME | End: 2021-05-06
Payer: COMMERCIAL

## 2021-05-05 VITALS
OXYGEN SATURATION: 97 % | TEMPERATURE: 98.3 F | HEART RATE: 91 BPM | WEIGHT: 140 LBS | SYSTOLIC BLOOD PRESSURE: 120 MMHG | RESPIRATION RATE: 18 BRPM | DIASTOLIC BLOOD PRESSURE: 87 MMHG | BODY MASS INDEX: 25.61 KG/M2

## 2021-05-05 DIAGNOSIS — R26.9 GAIT ABNORMALITY: ICD-10-CM

## 2021-05-05 DIAGNOSIS — N39.0 URINARY TRACT INFECTION WITH HEMATURIA, SITE UNSPECIFIED: ICD-10-CM

## 2021-05-05 DIAGNOSIS — R31.9 URINARY TRACT INFECTION WITH HEMATURIA, SITE UNSPECIFIED: ICD-10-CM

## 2021-05-05 DIAGNOSIS — E87.1 HYPONATREMIA: ICD-10-CM

## 2021-05-05 DIAGNOSIS — M25.511 RIGHT SHOULDER PAIN, UNSPECIFIED CHRONICITY: Primary | ICD-10-CM

## 2021-05-05 LAB
ALBUMIN SERPL-MCNC: 3.7 G/DL (ref 3.5–5.2)
ALP BLD-CCNC: 156 U/L (ref 35–104)
ALT SERPL-CCNC: 5 U/L (ref 0–32)
ANION GAP SERPL CALCULATED.3IONS-SCNC: 8 MMOL/L (ref 7–16)
AST SERPL-CCNC: 16 U/L (ref 0–31)
BACTERIA: ABNORMAL /HPF
BASOPHILS ABSOLUTE: 0.03 E9/L (ref 0–0.2)
BASOPHILS RELATIVE PERCENT: 0.3 % (ref 0–2)
BILIRUB SERPL-MCNC: 0.3 MG/DL (ref 0–1.2)
BILIRUBIN URINE: NEGATIVE
BLOOD, URINE: ABNORMAL
BUN BLDV-MCNC: 15 MG/DL (ref 6–23)
CALCIUM SERPL-MCNC: 9.5 MG/DL (ref 8.6–10.2)
CHLORIDE BLD-SCNC: 95 MMOL/L (ref 98–107)
CLARITY: CLEAR
CO2: 25 MMOL/L (ref 22–29)
COLOR: YELLOW
CREAT SERPL-MCNC: 0.5 MG/DL (ref 0.5–1)
EOSINOPHILS ABSOLUTE: 0.27 E9/L (ref 0.05–0.5)
EOSINOPHILS RELATIVE PERCENT: 2.9 % (ref 0–6)
GFR AFRICAN AMERICAN: >60
GFR NON-AFRICAN AMERICAN: >60 ML/MIN/1.73
GLUCOSE BLD-MCNC: 147 MG/DL (ref 74–99)
GLUCOSE URINE: NEGATIVE MG/DL
HCT VFR BLD CALC: 34.2 % (ref 34–48)
HEMOGLOBIN: 11.2 G/DL (ref 11.5–15.5)
IMMATURE GRANULOCYTES #: 0.05 E9/L
IMMATURE GRANULOCYTES %: 0.5 % (ref 0–5)
KETONES, URINE: NEGATIVE MG/DL
LEUKOCYTE ESTERASE, URINE: ABNORMAL
LYMPHOCYTES ABSOLUTE: 1.19 E9/L (ref 1.5–4)
LYMPHOCYTES RELATIVE PERCENT: 12.8 % (ref 20–42)
MCH RBC QN AUTO: 31.1 PG (ref 26–35)
MCHC RBC AUTO-ENTMCNC: 32.7 % (ref 32–34.5)
MCV RBC AUTO: 95 FL (ref 80–99.9)
MONOCYTES ABSOLUTE: 1.24 E9/L (ref 0.1–0.95)
MONOCYTES RELATIVE PERCENT: 13.3 % (ref 2–12)
NEUTROPHILS ABSOLUTE: 6.53 E9/L (ref 1.8–7.3)
NEUTROPHILS RELATIVE PERCENT: 70.2 % (ref 43–80)
NITRITE, URINE: POSITIVE
PDW BLD-RTO: 13 FL (ref 11.5–15)
PH UA: 6 (ref 5–9)
PLATELET # BLD: 391 E9/L (ref 130–450)
PMV BLD AUTO: 8.4 FL (ref 7–12)
POTASSIUM SERPL-SCNC: 4.4 MMOL/L (ref 3.5–5)
PROTEIN UA: 100 MG/DL
RBC # BLD: 3.6 E12/L (ref 3.5–5.5)
RBC UA: ABNORMAL /HPF (ref 0–2)
SARS-COV-2, NAAT: NOT DETECTED
SODIUM BLD-SCNC: 128 MMOL/L (ref 132–146)
SPECIFIC GRAVITY UA: 1.02 (ref 1–1.03)
TOTAL PROTEIN: 7.6 G/DL (ref 6.4–8.3)
UROBILINOGEN, URINE: 0.2 E.U./DL
WBC # BLD: 9.3 E9/L (ref 4.5–11.5)
WBC UA: >20 /HPF (ref 0–5)
YEAST: PRESENT /HPF

## 2021-05-05 PROCEDURE — 87088 URINE BACTERIA CULTURE: CPT

## 2021-05-05 PROCEDURE — 81001 URINALYSIS AUTO W/SCOPE: CPT

## 2021-05-05 PROCEDURE — 85025 COMPLETE CBC W/AUTO DIFF WBC: CPT

## 2021-05-05 PROCEDURE — 6370000000 HC RX 637 (ALT 250 FOR IP): Performed by: PHYSICIAN ASSISTANT

## 2021-05-05 PROCEDURE — 87635 SARS-COV-2 COVID-19 AMP PRB: CPT

## 2021-05-05 PROCEDURE — 73030 X-RAY EXAM OF SHOULDER: CPT

## 2021-05-05 PROCEDURE — 2580000003 HC RX 258: Performed by: PHYSICIAN ASSISTANT

## 2021-05-05 PROCEDURE — 99284 EMERGENCY DEPT VISIT MOD MDM: CPT

## 2021-05-05 PROCEDURE — 96365 THER/PROPH/DIAG IV INF INIT: CPT

## 2021-05-05 PROCEDURE — 80053 COMPREHEN METABOLIC PANEL: CPT

## 2021-05-05 PROCEDURE — 36415 COLL VENOUS BLD VENIPUNCTURE: CPT

## 2021-05-05 RX ORDER — CEFDINIR 300 MG/1
300 CAPSULE ORAL 2 TIMES DAILY
Qty: 20 CAPSULE | Refills: 0 | Status: SHIPPED | OUTPATIENT
Start: 2021-05-05 | End: 2021-05-15

## 2021-05-05 RX ORDER — 0.9 % SODIUM CHLORIDE 0.9 %
1000 INTRAVENOUS SOLUTION INTRAVENOUS ONCE
Status: COMPLETED | OUTPATIENT
Start: 2021-05-05 | End: 2021-05-05

## 2021-05-05 RX ORDER — CEFDINIR 300 MG/1
300 CAPSULE ORAL ONCE
Status: DISCONTINUED | OUTPATIENT
Start: 2021-05-05 | End: 2021-05-05

## 2021-05-05 RX ORDER — ACETAMINOPHEN 325 MG/1
650 TABLET ORAL ONCE
Status: COMPLETED | OUTPATIENT
Start: 2021-05-05 | End: 2021-05-05

## 2021-05-05 RX ADMIN — SODIUM CHLORIDE 1000 ML: 9 INJECTION, SOLUTION INTRAVENOUS at 20:28

## 2021-05-05 RX ADMIN — ACETAMINOPHEN 650 MG: 325 TABLET ORAL at 20:26

## 2021-05-05 ASSESSMENT — PAIN SCALES - GENERAL: PAINLEVEL_OUTOF10: 0

## 2021-05-05 NOTE — ED PROVIDER NOTES
ED Attending  CC: No  HPI:  5/5/21, Time: 7:17 PM EDT         Hira Rojas is a 64 y.o. female presenting to the ED for right shoulder pain , beginning chronic  The complaint has been persistent, mild in severity, and worsened by movement of right shoulder . Patient comes in with complaint of right shoulder pain and possible low potassium. Patient states she injured her right shoulder 2 days ago. Has pain with range of motion. She denies any head injury no loss of consciousness no neck pain. She has concern for possible hypokalemia. She denies any chest pain palpitations diaphoresis nausea. Denies any abdominal pain. No dysuria. Patient has history of dementia history is limited  Review of Systems:   A complete review of systems was performed and pertinent positives and negatives are stated within HPI, all other systems reviewed and are negative.          --------------------------------------------- PAST HISTORY ---------------------------------------------  Past Medical History:  has a past medical history of Cancer ([facility] [address] Past Surgical History:  has a past surgical history that includes Appendectomy. Social History:  reports that she has never smoked. She has never used smokeless tobacco. She reports previous alcohol use. She reports previous drug use. Family History: family history is not on file. The patients home medications have been reviewed. Allergies: Patient has no known allergies.     -------------------------------------------------- RESULTS -------------------------------------------------  All laboratory and radiology results have been personally reviewed by myself   LABS:  Results for orders placed or performed during the hospital encounter of 05/05/21   COVID-19, Rapid    Specimen: Nasopharyngeal Swab   Result Value Ref Range    SARS-CoV-2, NAAT Not Detected Not Detected   CBC Auto Differential   Result Value Ref Range    WBC 9.3 4.5 - 11.5 E9/L    RBC 3.60 3.50 - 5.50 E12/L    Hemoglobin 11.2 (L) 11.5 - 15.5 g/dL    Hematocrit 34.2 34.0 - 48.0 %    MCV 95.0 80.0 - 99.9 fL    MCH 31.1 26.0 - 35.0 pg    MCHC 32.7 32.0 - 34.5 %    RDW 13.0 11.5 - 15.0 fL    Platelets 886 678 - 749 E9/L    MPV 8.4 7.0 - 12.0 fL    Neutrophils % 70.2 43.0 - 80.0 %    Immature Granulocytes % 0.5 0.0 - 5.0 %    Lymphocytes % 12.8 (L) 20.0 - 42.0 %    Monocytes % 13.3 (H) 2.0 - 12.0 %    Eosinophils % 2.9 0.0 - 6.0 %    Basophils % 0.3 0.0 - 2.0 %    Neutrophils Absolute 6.53 1.80 - 7.30 E9/L    Immature Granulocytes # 0.05 E9/L    Lymphocytes Absolute 1.19 (L) 1.50 - 4.00 E9/L    Monocytes Absolute 1.24 (H) 0.10 - 0.95 E9/L    Eosinophils Absolute 0.27 0.05 - 0.50 E9/L    Basophils Absolute 0.03 0.00 - 0.20 E9/L   Comprehensive Metabolic Panel   Result Value Ref Range    Sodium 128 (L) 132 - 146 mmol/L    Potassium 4.4 3.5 - 5.0 mmol/L    Chloride 95 (L) 98 - 107 mmol/L    CO2 25 22 - 29 mmol/L    Anion Gap 8 7 - 16 mmol/L    Glucose 147 (H) 74 - 99 mg/dL    BUN 15 6 - 23 mg/dL    CREATININE 0.5 0.5 - 1.0 mg/dL    GFR Non-African American >60 >=60 mL/min/1.73    GFR African American >60     Calcium 9.5 8.6 - 10.2 mg/dL    Total Protein 7.6 6.4 - 8.3 g/dL    Albumin 3.7 3.5 - 5.2 g/dL    Total Bilirubin 0.3 0.0 - 1.2 mg/dL    Alkaline Phosphatase 156 (H) 35 - 104 U/L    ALT 5 0 - 32 U/L    AST 16 0 - 31 U/L   Urinalysis   Result Value Ref Range    Color, UA Yellow Straw/Yellow    Clarity, UA Clear Clear    Glucose, Ur Negative Negative mg/dL    Bilirubin Urine Negative Negative    Ketones, Urine Negative Negative mg/dL    Specific Gravity, UA 1.025 1.005 - 1.030    Blood, Urine SMALL (A) Negative    pH, UA 6.0 5.0 - 9.0    Protein,  (A) Negative mg/dL    Urobilinogen, Urine 0.2 <2.0 E.U./dL    Nitrite, Urine POSITIVE (A) Negative    Leukocyte Esterase, Urine MODERATE (A) Negative   Microscopic Urinalysis   Result Value Ref Range    WBC, UA >20 (A) 0 - 5 /HPF    RBC, UA 2-5 0 - 2 /HPF injury to the shoulder. Patient was updated on plan for giving him normal saline with plan of discharge. The do have concern for urinary tract infection she has history of frequent urinary tract infections and low potassium. Family called in requesting Covid testing be performed they are planning on the patient flying back to [facility] on Saturday. They are requesting that the patient be admitted due to her not eating drinking or ambulating well over the last month. Family does not want the patient admitted for placement in a nursing home they are agreeable to discharge to home with treatment for urinary tract infection    Medical Decision Making:   Patient is brought in by family with complaint of her not eating or drinking well and not able to ambulate while at home with progressive worsening of her dementia. She has history of bilateral nephrostomy tubes they requested a urinalysis which showed urinary tract infection which may be likely chronic in nature she had a normal white cell count did not appear septic. She was placed on Omnicef. She was noted to have sodium of 128 she was given a liter of saline. Patient will be discharged home family does not want patient admitted for placement in nursing home    Counseling: The emergency provider has spoken with the patient and discussed todays results, in addition to providing specific details for the plan of care and counseling regarding the diagnosis and prognosis. Questions are answered at this time and they are agreeable with the plan.      --------------------------------- IMPRESSION AND DISPOSITION ---------------------------------    IMPRESSION  1. Right shoulder pain, unspecified chronicity    2. Hyponatremia    3. Urinary tract infection with hematuria, site unspecified    4. Gait abnormality        DISPOSITION  Disposition: Discharge to home  Patient condition is good      NOTE: This report was transcribed using voice recognition software. Every effort was made to ensure accuracy; however, inadvertent computerized transcription errors may be present     Jerri Espino  05/06/21 0206

## 2021-05-05 NOTE — ED NOTES
Pt to ed for R shoulder pain. Per EMS pt has dementia and family denies injury/fall.  No signs of discomfort when palpating shoulder and pt has full range of motion      Kitty Barber RN  05/05/21 [id number]

## 2021-05-06 PROCEDURE — 6360000002 HC RX W HCPCS: Performed by: PHYSICIAN ASSISTANT

## 2021-05-06 PROCEDURE — 2580000003 HC RX 258: Performed by: PHYSICIAN ASSISTANT

## 2021-05-06 RX ADMIN — CEFTRIAXONE 1000 MG: 1 INJECTION, POWDER, FOR SOLUTION INTRAMUSCULAR; INTRAVENOUS at 00:05

## 2021-05-06 NOTE — ED NOTES
Discharged with a followup to PCP.  Return here if worse or concerns     Jessica Epps, AMANDA  [phone]

## 2021-05-07 LAB — URINE CULTURE, ROUTINE: NORMAL
